# Patient Record
Sex: FEMALE | NOT HISPANIC OR LATINO | Employment: OTHER | URBAN - NONMETROPOLITAN AREA
[De-identification: names, ages, dates, MRNs, and addresses within clinical notes are randomized per-mention and may not be internally consistent; named-entity substitution may affect disease eponyms.]

---

## 2017-09-03 ENCOUNTER — APPOINTMENT (OUTPATIENT)
Dept: GENERAL RADIOLOGY | Facility: HOSPITAL | Age: 82
End: 2017-09-03

## 2017-09-03 ENCOUNTER — APPOINTMENT (OUTPATIENT)
Dept: CT IMAGING | Facility: HOSPITAL | Age: 82
End: 2017-09-03

## 2017-09-03 ENCOUNTER — HOSPITAL ENCOUNTER (EMERGENCY)
Facility: HOSPITAL | Age: 82
Discharge: HOME | End: 2017-09-03
Attending: EMERGENCY MEDICINE | Admitting: EMERGENCY MEDICINE

## 2017-09-03 VITALS
BODY MASS INDEX: 26.33 KG/M2 | HEART RATE: 92 BPM | HEIGHT: 65 IN | OXYGEN SATURATION: 99 % | DIASTOLIC BLOOD PRESSURE: 72 MMHG | RESPIRATION RATE: 18 BRPM | WEIGHT: 158 LBS | SYSTOLIC BLOOD PRESSURE: 140 MMHG | TEMPERATURE: 98.7 F

## 2017-09-03 DIAGNOSIS — S00.01XA SCALP ABRASION, INITIAL ENCOUNTER: Primary | ICD-10-CM

## 2017-09-03 DIAGNOSIS — N39.0 URINARY TRACT INFECTION WITHOUT HEMATURIA, SITE UNSPECIFIED: ICD-10-CM

## 2017-09-03 DIAGNOSIS — S00.03XA SCALP HEMATOMA, INITIAL ENCOUNTER: ICD-10-CM
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
## 2017-09-03 LAB
ALBUMIN SERPL-MCNC: 4.1 G/DL (ref 3.5–5)
ALBUMIN/GLOB SERPL: 1.2 G/DL (ref 1–2)
ALP SERPL-CCNC: 84 U/L (ref 38–126)
ALT SERPL W P-5'-P-CCNC: 28 U/L (ref 13–69)
ANION GAP SERPL CALCULATED.3IONS-SCNC: 16.5 MMOL/L
AST SERPL-CCNC: 23 U/L (ref 15–46)
BACTERIA UR QL AUTO: ABNORMAL /HPF
BASOPHILS # BLD AUTO: 0.04 10*3/MM3 (ref 0–0.2)
BASOPHILS NFR BLD AUTO: 0.5 % (ref 0–2.5)
BILIRUB SERPL-MCNC: 0.4 MG/DL (ref 0.2–1.3)
BILIRUB UR QL STRIP: NEGATIVE
BUN BLD-MCNC: 31 MG/DL (ref 7–20)
BUN/CREAT SERPL: 28.2 (ref 7.1–23.5)
CALCIUM SPEC-SCNC: 9.2 MG/DL (ref 8.4–10.2)
CHLORIDE SERPL-SCNC: 103 MMOL/L (ref 98–107)
CLARITY UR: ABNORMAL
CO2 SERPL-SCNC: 24 MMOL/L (ref 26–30)
COLOR UR: YELLOW
CREAT BLD-MCNC: 1.1 MG/DL (ref 0.6–1.3)
DEPRECATED RDW RBC AUTO: 49.8 FL (ref 37–54)
EOSINOPHIL # BLD AUTO: 0.25 10*3/MM3 (ref 0–0.7)
EOSINOPHIL NFR BLD AUTO: 3.2 % (ref 0–7)
ERYTHROCYTE [DISTWIDTH] IN BLOOD BY AUTOMATED COUNT: 14.7 % (ref 11.5–14.5)
GFR SERPL CREATININE-BSD FRML MDRD: 46 ML/MIN/1.73
GLOBULIN UR ELPH-MCNC: 3.4 GM/DL
GLUCOSE BLD-MCNC: 111 MG/DL (ref 74–98)
GLUCOSE UR STRIP-MCNC: NEGATIVE MG/DL
HCT VFR BLD AUTO: 35.8 % (ref 37–47)
HGB BLD-MCNC: 11.6 G/DL (ref 12–16)
HGB UR QL STRIP.AUTO: ABNORMAL
HYALINE CASTS UR QL AUTO: ABNORMAL /LPF
IMM GRANULOCYTES # BLD: 0.05 10*3/MM3 (ref 0–0.06)
IMM GRANULOCYTES NFR BLD: 0.6 % (ref 0–0.6)
KETONES UR QL STRIP: NEGATIVE
LEUKOCYTE ESTERASE UR QL STRIP.AUTO: ABNORMAL
LYMPHOCYTES # BLD AUTO: 1.78 10*3/MM3 (ref 0.6–3.4)
LYMPHOCYTES NFR BLD AUTO: 22.9 % (ref 10–50)
MCH RBC QN AUTO: 29.9 PG (ref 27–31)
MCHC RBC AUTO-ENTMCNC: 32.4 G/DL (ref 30–37)
MCV RBC AUTO: 92.3 FL (ref 81–99)
MONOCYTES # BLD AUTO: 0.73 10*3/MM3 (ref 0–0.9)
MONOCYTES NFR BLD AUTO: 9.4 % (ref 0–12)
NEUTROPHILS # BLD AUTO: 4.93 10*3/MM3 (ref 2–6.9)
NEUTROPHILS NFR BLD AUTO: 63.4 % (ref 37–80)
NITRITE UR QL STRIP: NEGATIVE
NRBC BLD MANUAL-RTO: 0.4 /100 WBC (ref 0–0)
PH UR STRIP.AUTO: 7 [PH] (ref 5–8)
PLATELET # BLD AUTO: 207 10*3/MM3 (ref 130–400)
PMV BLD AUTO: 9.5 FL (ref 6–12)
POTASSIUM BLD-SCNC: 4.5 MMOL/L (ref 3.5–5.1)
PROT SERPL-MCNC: 7.5 G/DL (ref 6.3–8.2)
PROT UR QL STRIP: NEGATIVE
RBC # BLD AUTO: 3.88 10*6/MM3 (ref 4.2–5.4)
RBC # UR: ABNORMAL /HPF
REF LAB TEST METHOD: ABNORMAL
SODIUM BLD-SCNC: 139 MMOL/L (ref 137–145)
SP GR UR STRIP: 1.01 (ref 1–1.03)
SQUAMOUS #/AREA URNS HPF: ABNORMAL /HPF
TROPONIN I SERPL-MCNC: <0.012 NG/ML (ref 0–0.03)
UROBILINOGEN UR QL STRIP: ABNORMAL
WBC NRBC COR # BLD: 7.78 10*3/MM3 (ref 4.8–10.8)
WBC UR QL AUTO: ABNORMAL /HPF

## 2017-09-03 PROCEDURE — 72125 CT NECK SPINE W/O DYE: CPT

## 2017-09-03 PROCEDURE — 73522 X-RAY EXAM HIPS BI 3-4 VIEWS: CPT

## 2017-09-03 PROCEDURE — 84484 ASSAY OF TROPONIN QUANT: CPT | Performed by: PHYSICIAN ASSISTANT

## 2017-09-03 PROCEDURE — 99284 EMERGENCY DEPT VISIT MOD MDM: CPT

## 2017-09-03 PROCEDURE — 87077 CULTURE AEROBIC IDENTIFY: CPT | Performed by: PHYSICIAN ASSISTANT

## 2017-09-03 PROCEDURE — 93005 ELECTROCARDIOGRAM TRACING: CPT | Performed by: PHYSICIAN ASSISTANT

## 2017-09-03 PROCEDURE — 87086 URINE CULTURE/COLONY COUNT: CPT | Performed by: PHYSICIAN ASSISTANT

## 2017-09-03 PROCEDURE — 71010 HC CHEST PA OR AP: CPT

## 2017-09-03 PROCEDURE — 80053 COMPREHEN METABOLIC PANEL: CPT | Performed by: PHYSICIAN ASSISTANT

## 2017-09-03 PROCEDURE — 87186 SC STD MICRODIL/AGAR DIL: CPT | Performed by: PHYSICIAN ASSISTANT

## 2017-09-03 PROCEDURE — 81001 URINALYSIS AUTO W/SCOPE: CPT | Performed by: PHYSICIAN ASSISTANT

## 2017-09-03 PROCEDURE — 85025 COMPLETE CBC W/AUTO DIFF WBC: CPT | Performed by: PHYSICIAN ASSISTANT

## 2017-09-03 PROCEDURE — 70450 CT HEAD/BRAIN W/O DYE: CPT

## 2017-09-03 RX ORDER — NITROFURANTOIN 25; 75 MG/1; MG/1
100 CAPSULE ORAL ONCE
Status: COMPLETED | OUTPATIENT
Start: 2017-09-03 | End: 2017-09-03

## 2017-09-03 RX ORDER — NITROFURANTOIN 25; 75 MG/1; MG/1
100 CAPSULE ORAL 2 TIMES DAILY
Qty: 14 CAPSULE | Refills: 0 | Status: SHIPPED | OUTPATIENT
Start: 2017-09-03 | End: 2017-12-02 | Stop reason: HOSPADM

## 2017-09-03 RX ADMIN — NITROFURANTOIN MONOHYDRATE/MACROCRYSTALLINE 100 MG: 25; 75 CAPSULE ORAL at 21:03

## 2017-09-03 NOTE — ED PROVIDER NOTES
Subjective   Patient is a 89 y.o. female presenting with fall.   History provided by:  Patient   used: No    Fall   Mechanism of injury: fall    Injury location:  Head/neck  Head/neck injury location:  Head  Arrived directly from scene: yes    Fall:     Fall occurred:  Walking    Impact surface:  [place]    Point of impact:  Head and neck    Entrapped after fall: no    Suspicion of alcohol use: no    Suspicion of drug use: no    Tetanus status:  Unknown  Prior to arrival data:     Bystander interventions:  Bystander C-spine precautions    Patient ambulatory at scene: no      Blood loss:  Minimal    Responsiveness at scene:  Alert    Orientation at scene:  Person, place and situation    Loss of consciousness: unknown       Amnesic to event: yes      Airway interventions:  None    Breathing interventions:  None  Current pain details:     Pain quality:  Aching, shooting and throbbing    Pain Severity:  Moderate  Associated symptoms: headaches and nausea    Associated symptoms: no abdominal pain, no back pain, no blindness and no neck pain    Risk factors: no AICD, no anticoagulation therapy, no asthma, no beta blocker therapy, no CHF, no COPD, no diabetes, no kidney disease, no pacemaker and no past MI        Review of Systems   Eyes: Negative for blindness.   Gastrointestinal: Positive for nausea. Negative for abdominal pain.   Musculoskeletal: Negative for back pain and neck pain.   Neurological: Positive for syncope and headaches.   All other systems reviewed and are negative.      Past Medical History:   Diagnosis Date   • Dementia        No Known Allergies    Past Surgical History:   Procedure Laterality Date   • HYSTERECTOMY         History reviewed. No pertinent family history.    Social History     Social History   • Marital status:      Spouse name: N/A   • Number of children: N/A   • Years of education: N/A     Social History Main Topics   • Smoking status: Never Smoker   •  Smokeless tobacco: None   • Alcohol use No   • Drug use: No   • Sexual activity: Not Asked     Other Topics Concern   • None     Social History Narrative   • None           Objective   Physical Exam   HENT:   Head: Normocephalic.       Right Ear: External ear normal.   Left Ear: External ear normal.   Nose: Nose normal.   Mouth/Throat: Oropharynx is clear and moist.   Large hematoma to right side of occipital lobe. Small abrasion present.    Eyes: Conjunctivae and EOM are normal. Pupils are equal, round, and reactive to light.   Neck: Normal range of motion. Neck supple. No JVD present. No tracheal deviation present. No thyromegaly present.   Cardiovascular: Normal rate and regular rhythm.    Murmur heard.   Systolic murmur is present with a grade of 4/6   Pulmonary/Chest: Effort normal. No stridor. No respiratory distress. She has no wheezes. She has no rales.   Abdominal: Bowel sounds are normal. She exhibits no distension. There is no tenderness.   Musculoskeletal: Normal range of motion. She exhibits tenderness. She exhibits no edema or deformity.        Right hip: She exhibits tenderness.        Left hip: She exhibits tenderness.   Neurological: She has normal reflexes. She is disoriented. She displays normal reflexes. No cranial nerve deficit or sensory deficit. Coordination normal.   Skin: Skin is warm. She is not diaphoretic.   Psychiatric: She has a normal mood and affect. Her behavior is normal. Judgment and thought content normal.   Nursing note and vitals reviewed.      Procedures         ED Course  ED Course   Comment By Time   Family is now at bedside. Patients grandson is at bedside and states he takes care of her. He states that patient walks with walker and slipped falling hitting her head against wall, she did not have LOC. She does have chronic dementia. Sonny Izquierdo PA-C 09/03 0867   89-year-old female came in after falling at home.  Patient did sustain a hematoma to the posterior aspect  of her occipital lobe.  Patient has a small abrasion.  There was nothing to suture or staple at this time. Patient will be treated for mild UTI. CT scan were negative for any acute findings. Sonny Izquierdo PA-C 09/03 2033                  MDM  Number of Diagnoses or Management Options  Fall, initial encounter: new and requires workup  Scalp abrasion, initial encounter: new and requires workup  Scalp hematoma, initial encounter: new and requires workup  Urinary tract infection without hematuria, site unspecified: new and requires workup     Amount and/or Complexity of Data Reviewed  Clinical lab tests: ordered and reviewed  Independent visualization of images, tracings, or specimens: yes    Risk of Complications, Morbidity, and/or Mortality  Presenting problems: moderate  Diagnostic procedures: moderate  Management options: moderate    Patient Progress  Patient progress: stable      Final diagnoses:   Scalp abrasion, initial encounter   Fall, initial encounter   Scalp hematoma, initial encounter   Urinary tract infection without hematuria, site unspecified            Sonny Izquierdo PA-C  09/03/17 2037

## 2017-09-06 LAB — BACTERIA SPEC AEROBE CULT: ABNORMAL

## 2017-09-13 ENCOUNTER — HOSPITAL ENCOUNTER (EMERGENCY)
Facility: HOSPITAL | Age: 82
Discharge: HOME | End: 2017-09-13
Attending: EMERGENCY MEDICINE | Admitting: EMERGENCY MEDICINE

## 2017-09-13 ENCOUNTER — APPOINTMENT (OUTPATIENT)
Dept: GENERAL RADIOLOGY | Facility: HOSPITAL | Age: 82
End: 2017-09-13
Attending: EMERGENCY MEDICINE

## 2017-09-13 VITALS
TEMPERATURE: 97.8 F | OXYGEN SATURATION: 96 % | SYSTOLIC BLOOD PRESSURE: 123 MMHG | DIASTOLIC BLOOD PRESSURE: 64 MMHG | HEIGHT: 63 IN | WEIGHT: 158 LBS | HEART RATE: 70 BPM | RESPIRATION RATE: 18 BRPM | BODY MASS INDEX: 28 KG/M2

## 2017-09-13 DIAGNOSIS — J18.9 PNEUMONIA OF LEFT LOWER LOBE DUE TO INFECTIOUS ORGANISM: Primary | ICD-10-CM

## 2017-09-13 LAB
ALBUMIN SERPL-MCNC: 4 G/DL (ref 3.5–5)
ALBUMIN/GLOB SERPL: 1.1 G/DL (ref 1–2)
ALP SERPL-CCNC: 85 U/L (ref 38–126)
ALT SERPL W P-5'-P-CCNC: 20 U/L (ref 13–69)
ANION GAP SERPL CALCULATED.3IONS-SCNC: 14.5 MMOL/L
AST SERPL-CCNC: 23 U/L (ref 15–46)
BACTERIA UR QL AUTO: ABNORMAL /HPF
BASOPHILS # BLD AUTO: 0.04 10*3/MM3 (ref 0–0.2)
BASOPHILS NFR BLD AUTO: 0.5 % (ref 0–2.5)
BILIRUB SERPL-MCNC: 0.5 MG/DL (ref 0.2–1.3)
BILIRUB UR QL STRIP: NEGATIVE
BUN BLD-MCNC: 29 MG/DL (ref 7–20)
BUN/CREAT SERPL: 24.2 (ref 7.1–23.5)
CALCIUM SPEC-SCNC: 9.3 MG/DL (ref 8.4–10.2)
CHLORIDE SERPL-SCNC: 102 MMOL/L (ref 98–107)
CLARITY UR: CLEAR
CO2 SERPL-SCNC: 25 MMOL/L (ref 26–30)
COLOR UR: YELLOW
CREAT BLD-MCNC: 1.2 MG/DL (ref 0.6–1.3)
D-LACTATE SERPL-SCNC: 1.2 MMOL/L (ref 0.5–2)
DEPRECATED RDW RBC AUTO: 47.3 FL (ref 37–54)
EOSINOPHIL # BLD AUTO: 0.3 10*3/MM3 (ref 0–0.7)
EOSINOPHIL NFR BLD AUTO: 3.5 % (ref 0–7)
ERYTHROCYTE [DISTWIDTH] IN BLOOD BY AUTOMATED COUNT: 14.2 % (ref 11.5–14.5)
GFR SERPL CREATININE-BSD FRML MDRD: 42 ML/MIN/1.73
GLOBULIN UR ELPH-MCNC: 3.5 GM/DL
GLUCOSE BLD-MCNC: 109 MG/DL (ref 74–98)
GLUCOSE UR STRIP-MCNC: NEGATIVE MG/DL
HCT VFR BLD AUTO: 34.2 % (ref 37–47)
HGB BLD-MCNC: 11.1 G/DL (ref 12–16)
HGB UR QL STRIP.AUTO: ABNORMAL
HOLD SPECIMEN: NORMAL
HOLD SPECIMEN: NORMAL
HYALINE CASTS UR QL AUTO: ABNORMAL /LPF
IMM GRANULOCYTES # BLD: 0.04 10*3/MM3 (ref 0–0.06)
IMM GRANULOCYTES NFR BLD: 0.5 % (ref 0–0.6)
KETONES UR QL STRIP: NEGATIVE
LEUKOCYTE ESTERASE UR QL STRIP.AUTO: NEGATIVE
LYMPHOCYTES # BLD AUTO: 1.53 10*3/MM3 (ref 0.6–3.4)
LYMPHOCYTES NFR BLD AUTO: 18 % (ref 10–50)
MCH RBC QN AUTO: 29.5 PG (ref 27–31)
MCHC RBC AUTO-ENTMCNC: 32.5 G/DL (ref 30–37)
MCV RBC AUTO: 91 FL (ref 81–99)
MONOCYTES # BLD AUTO: 0.84 10*3/MM3 (ref 0–0.9)
MONOCYTES NFR BLD AUTO: 9.9 % (ref 0–12)
MUCOUS THREADS URNS QL MICRO: ABNORMAL /HPF
NEUTROPHILS # BLD AUTO: 5.76 10*3/MM3 (ref 2–6.9)
NEUTROPHILS NFR BLD AUTO: 67.6 % (ref 37–80)
NITRITE UR QL STRIP: NEGATIVE
NRBC BLD MANUAL-RTO: 0 /100 WBC (ref 0–0)
PH UR STRIP.AUTO: 7 [PH] (ref 5–8)
PLATELET # BLD AUTO: 243 10*3/MM3 (ref 130–400)
PMV BLD AUTO: 9.8 FL (ref 6–12)
POTASSIUM BLD-SCNC: 4.5 MMOL/L (ref 3.5–5.1)
PROT SERPL-MCNC: 7.5 G/DL (ref 6.3–8.2)
PROT UR QL STRIP: NEGATIVE
RBC # BLD AUTO: 3.76 10*6/MM3 (ref 4.2–5.4)
RBC # UR: ABNORMAL /HPF
REF LAB TEST METHOD: ABNORMAL
SODIUM BLD-SCNC: 137 MMOL/L (ref 137–145)
SP GR UR STRIP: 1.01 (ref 1–1.03)
SQUAMOUS #/AREA URNS HPF: ABNORMAL /HPF
TROPONIN I SERPL-MCNC: 0.02 NG/ML (ref 0–0.03)
UROBILINOGEN UR QL STRIP: ABNORMAL
WBC NRBC COR # BLD: 8.51 10*3/MM3 (ref 4.8–10.8)
WBC UR QL AUTO: ABNORMAL /HPF
WHOLE BLOOD HOLD SPECIMEN: NORMAL
WHOLE BLOOD HOLD SPECIMEN: NORMAL

## 2017-09-13 PROCEDURE — 99284 EMERGENCY DEPT VISIT MOD MDM: CPT

## 2017-09-13 PROCEDURE — 80053 COMPREHEN METABOLIC PANEL: CPT | Performed by: EMERGENCY MEDICINE

## 2017-09-13 PROCEDURE — 85025 COMPLETE CBC W/AUTO DIFF WBC: CPT | Performed by: EMERGENCY MEDICINE

## 2017-09-13 PROCEDURE — 84484 ASSAY OF TROPONIN QUANT: CPT | Performed by: EMERGENCY MEDICINE

## 2017-09-13 PROCEDURE — 96360 HYDRATION IV INFUSION INIT: CPT

## 2017-09-13 PROCEDURE — 81001 URINALYSIS AUTO W/SCOPE: CPT | Performed by: EMERGENCY MEDICINE

## 2017-09-13 PROCEDURE — 93005 ELECTROCARDIOGRAM TRACING: CPT | Performed by: EMERGENCY MEDICINE

## 2017-09-13 PROCEDURE — 71020 HC CHEST PA AND LATERAL: CPT

## 2017-09-13 PROCEDURE — 83605 ASSAY OF LACTIC ACID: CPT | Performed by: EMERGENCY MEDICINE

## 2017-09-13 PROCEDURE — 96361 HYDRATE IV INFUSION ADD-ON: CPT

## 2017-09-13 RX ORDER — LEVOFLOXACIN 750 MG/1
375 TABLET ORAL DAILY
Qty: 4 TABLET | Refills: 0 | Status: SHIPPED | OUTPATIENT
Start: 2017-09-13 | End: 2017-09-20

## 2017-09-13 RX ORDER — SODIUM CHLORIDE 0.9 % (FLUSH) 0.9 %
10 SYRINGE (ML) INJECTION AS NEEDED
Status: DISCONTINUED | OUTPATIENT
Start: 2017-09-13 | End: 2017-09-13 | Stop reason: HOSPADM

## 2017-09-13 RX ADMIN — SODIUM CHLORIDE 1000 ML: 9 INJECTION, SOLUTION INTRAVENOUS at 15:00

## 2017-09-13 NOTE — ED PROVIDER NOTES
"Subjective   HPI Comments: 89-year-old female presenting with general malaise.  She is brought in by her daughter-in-law who provides all the history as the patient has a history of dementia.  Apparently 2 weeks ago patient was seen here in this ED for a fall, she was diagnosed with a urinary tract infection and placed on Macrobid.  She was told by her primary doctor, LEENA, this was not working so she was switched to Cipro.  Apparently they called back again and said this was not working and wrote for Cefdinir.  He had 2 doses of this.  Her daughter-in-law says that she is become \"lethargic\" and she is afraid she is becoming \"septic\".  She states that she looked flushed and has had some low blood pressures.  There's been no report of cough, vomiting, diarrhea.      Review of Systems   Unable to perform ROS: Dementia       Past Medical History:   Diagnosis Date   • Dementia        No Known Allergies    Past Surgical History:   Procedure Laterality Date   • HYSTERECTOMY         History reviewed. No pertinent family history.    Social History     Social History   • Marital status:      Spouse name: N/A   • Number of children: N/A   • Years of education: N/A     Social History Main Topics   • Smoking status: Never Smoker   • Smokeless tobacco: None   • Alcohol use No   • Drug use: No   • Sexual activity: Not Asked     Other Topics Concern   • None     Social History Narrative           Objective   Physical Exam   Constitutional: She appears well-developed and well-nourished. No distress.   HENT:   Head: Normocephalic and atraumatic.   Right Ear: External ear normal.   Left Ear: External ear normal.   Nose: Nose normal.   Mouth/Throat: Oropharynx is clear and moist.   Eyes: Conjunctivae and EOM are normal. Pupils are equal, round, and reactive to light.   Neck: Normal range of motion. Neck supple.   Cardiovascular: Normal rate, regular rhythm, normal heart sounds and intact distal pulses.    Pulmonary/Chest: " "Effort normal and breath sounds normal. No respiratory distress.   Abdominal: Soft. Bowel sounds are normal. She exhibits no distension. There is no tenderness. There is no rebound and no guarding.   Musculoskeletal: Normal range of motion. She exhibits no edema, tenderness or deformity.   Neurological: She is alert.   Skin: Skin is warm and dry. No rash noted.   Psychiatric: She has a normal mood and affect. Her behavior is normal.   Nursing note and vitals reviewed.      Procedures         ED Course  ED Course                  MDM  Number of Diagnoses or Management Options  Pneumonia of left lower lobe due to infectious organism:   Diagnosis management comments: 89 year old female with \"lethargy\" and \"concern about sepsis\". Well developed, well nourished elderly demented female in no distress with normal vital signs and an otherwise non focal exam. Will check ekg, cxr, ua and labs. Will hydrate. Disposition pending work up.    Ddx: uti, pna, dehydration, tony, lyte abnormality    Ekg: sinus rhythm, PVC, no acute ST changes    Labs are essentially unremarkable. UA is actually clear. CXR notable for LLL atelectasis v infiltrate. Long discussion with family regarding plan of care. Offered admission for IV antibiotics which would be reasonable given she was on antibiotics. They prefer to go home and treat with PO antibiotics. Her daughter in law is a nurse and is comfortable caring for her. I think this is a reasonable plan as well. Return precautions discussed. They are comfortable with and understanding of the plan.       Amount and/or Complexity of Data Reviewed  Decide to obtain previous medical records or to obtain history from someone other than the patient: yes        Final diagnoses:   Pneumonia of left lower lobe due to infectious organism            Sandro Garcia MD  09/14/17 0708    "

## 2017-11-30 ENCOUNTER — APPOINTMENT (OUTPATIENT)
Dept: CT IMAGING | Facility: HOSPITAL | Age: 82
End: 2017-11-30

## 2017-11-30 ENCOUNTER — APPOINTMENT (OUTPATIENT)
Dept: GENERAL RADIOLOGY | Facility: HOSPITAL | Age: 82
End: 2017-11-30

## 2017-11-30 ENCOUNTER — HOSPITAL ENCOUNTER (OUTPATIENT)
Facility: HOSPITAL | Age: 82
Setting detail: OBSERVATION
Discharge: HOME | End: 2017-12-02
Attending: EMERGENCY MEDICINE | Admitting: INTERNAL MEDICINE

## 2017-11-30 DIAGNOSIS — R11.10 VOMITING, INTRACTABILITY OF VOMITING NOT SPECIFIED, PRESENCE OF NAUSEA NOT SPECIFIED, UNSPECIFIED VOMITING TYPE: ICD-10-CM

## 2017-11-30 DIAGNOSIS — R55 SYNCOPE, UNSPECIFIED SYNCOPE TYPE: Primary | ICD-10-CM

## 2017-11-30 DIAGNOSIS — Z74.09 IMPAIRED FUNCTIONAL MOBILITY, BALANCE, GAIT, AND ENDURANCE: ICD-10-CM

## 2017-11-30 LAB
ALBUMIN SERPL-MCNC: 3.8 G/DL (ref 3.5–5)
ALBUMIN/GLOB SERPL: 1.2 G/DL (ref 1–2)
ALP SERPL-CCNC: 89 U/L (ref 38–126)
ALT SERPL W P-5'-P-CCNC: 22 U/L (ref 13–69)
ANION GAP SERPL CALCULATED.3IONS-SCNC: 15.5 MMOL/L
AST SERPL-CCNC: 25 U/L (ref 15–46)
BASOPHILS # BLD AUTO: 0.03 10*3/MM3 (ref 0–0.2)
BASOPHILS NFR BLD AUTO: 0.4 % (ref 0–2.5)
BILIRUB SERPL-MCNC: 0.4 MG/DL (ref 0.2–1.3)
BUN BLD-MCNC: 32 MG/DL (ref 7–20)
BUN/CREAT SERPL: 32 (ref 7.1–23.5)
CALCIUM SPEC-SCNC: 9 MG/DL (ref 8.4–10.2)
CHLORIDE SERPL-SCNC: 103 MMOL/L (ref 98–107)
CO2 SERPL-SCNC: 22 MMOL/L (ref 26–30)
CREAT BLD-MCNC: 1 MG/DL (ref 0.6–1.3)
DEPRECATED RDW RBC AUTO: 46.9 FL (ref 37–54)
EOSINOPHIL # BLD AUTO: 0.25 10*3/MM3 (ref 0–0.7)
EOSINOPHIL NFR BLD AUTO: 3.5 % (ref 0–7)
ERYTHROCYTE [DISTWIDTH] IN BLOOD BY AUTOMATED COUNT: 13.8 % (ref 11.5–14.5)
GFR SERPL CREATININE-BSD FRML MDRD: 52 ML/MIN/1.73
GLOBULIN UR ELPH-MCNC: 3.2 GM/DL
GLUCOSE BLD-MCNC: 154 MG/DL (ref 74–98)
HCT VFR BLD AUTO: 33.2 % (ref 37–47)
HGB BLD-MCNC: 10.8 G/DL (ref 12–16)
IMM GRANULOCYTES # BLD: 0.02 10*3/MM3 (ref 0–0.06)
IMM GRANULOCYTES NFR BLD: 0.3 % (ref 0–0.6)
LIPASE SERPL-CCNC: 102 U/L (ref 23–300)
LYMPHOCYTES # BLD AUTO: 1.35 10*3/MM3 (ref 0.6–3.4)
LYMPHOCYTES NFR BLD AUTO: 19.1 % (ref 10–50)
MCH RBC QN AUTO: 30 PG (ref 27–31)
MCHC RBC AUTO-ENTMCNC: 32.5 G/DL (ref 30–37)
MCV RBC AUTO: 92.2 FL (ref 81–99)
MONOCYTES # BLD AUTO: 0.74 10*3/MM3 (ref 0–0.9)
MONOCYTES NFR BLD AUTO: 10.5 % (ref 0–12)
NEUTROPHILS # BLD AUTO: 4.66 10*3/MM3 (ref 2–6.9)
NEUTROPHILS NFR BLD AUTO: 66.2 % (ref 37–80)
NRBC BLD MANUAL-RTO: 0 /100 WBC (ref 0–0)
NT-PROBNP SERPL-MCNC: 7820 PG/ML (ref 0–450)
PLATELET # BLD AUTO: 214 10*3/MM3 (ref 130–400)
PMV BLD AUTO: 9.6 FL (ref 6–12)
POTASSIUM BLD-SCNC: 3.5 MMOL/L (ref 3.5–5.1)
PROT SERPL-MCNC: 7 G/DL (ref 6.3–8.2)
RBC # BLD AUTO: 3.6 10*6/MM3 (ref 4.2–5.4)
SODIUM BLD-SCNC: 137 MMOL/L (ref 137–145)
TROPONIN I SERPL-MCNC: <0.012 NG/ML (ref 0–0.03)
WBC NRBC COR # BLD: 7.05 10*3/MM3 (ref 4.8–10.8)

## 2017-11-30 PROCEDURE — 93005 ELECTROCARDIOGRAM TRACING: CPT | Performed by: EMERGENCY MEDICINE

## 2017-11-30 PROCEDURE — 84484 ASSAY OF TROPONIN QUANT: CPT | Performed by: EMERGENCY MEDICINE

## 2017-11-30 PROCEDURE — 70450 CT HEAD/BRAIN W/O DYE: CPT

## 2017-11-30 PROCEDURE — 71020 HC CHEST PA AND LATERAL: CPT

## 2017-11-30 PROCEDURE — 85025 COMPLETE CBC W/AUTO DIFF WBC: CPT | Performed by: EMERGENCY MEDICINE

## 2017-11-30 PROCEDURE — 83880 ASSAY OF NATRIURETIC PEPTIDE: CPT | Performed by: EMERGENCY MEDICINE

## 2017-11-30 PROCEDURE — 99284 EMERGENCY DEPT VISIT MOD MDM: CPT

## 2017-11-30 PROCEDURE — 80053 COMPREHEN METABOLIC PANEL: CPT | Performed by: EMERGENCY MEDICINE

## 2017-11-30 PROCEDURE — 83690 ASSAY OF LIPASE: CPT | Performed by: EMERGENCY MEDICINE

## 2017-12-01 ENCOUNTER — APPOINTMENT (OUTPATIENT)
Dept: CARDIOLOGY | Facility: HOSPITAL | Age: 82
End: 2017-12-01
Attending: INTERNAL MEDICINE

## 2017-12-01 PROBLEM — G30.1 ALZHEIMER'S DEMENTIA, LATE ONSET (HCC): Chronic | Status: ACTIVE | Noted: 2017-12-01

## 2017-12-01 PROBLEM — F03.90 DEMENTIA (HCC): Status: ACTIVE | Noted: 2017-12-01

## 2017-12-01 PROBLEM — F01.50 MULTI-INFARCT DEMENTIA (HCC): Chronic | Status: ACTIVE | Noted: 2017-12-01

## 2017-12-01 PROBLEM — I63.9 STROKE (HCC): Status: ACTIVE | Noted: 2017-12-01

## 2017-12-01 PROBLEM — R55 SYNCOPE: Status: ACTIVE | Noted: 2017-12-01

## 2017-12-01 PROBLEM — D64.9 MILD ANEMIA: Chronic | Status: ACTIVE | Noted: 2017-12-01

## 2017-12-01 PROBLEM — I49.8 OTHER SPECIFIED CARDIAC ARRHYTHMIAS (CODE): Status: ACTIVE | Noted: 2017-12-01

## 2017-12-01 PROBLEM — F02.80 ALZHEIMER'S DEMENTIA, LATE ONSET (HCC): Chronic | Status: ACTIVE | Noted: 2017-12-01

## 2017-12-01 LAB
AMORPH URATE CRY URNS QL MICRO: ABNORMAL /HPF
BACTERIA UR QL AUTO: ABNORMAL /HPF
BH CV ECHO MEAS - AO MAX PG: 74 MMHG
BH CV ECHO MEAS - AO MEAN PG: 46 MMHG
BH CV ECHO MEAS - EF(MOD-SP4): 70 %
BH CV ECHO MEAS - RVSP: 48 MMHG
BILIRUB UR QL STRIP: NEGATIVE
CLARITY UR: CLEAR
COLOR UR: YELLOW
ERYTHROCYTE [SEDIMENTATION RATE] IN BLOOD: 40 MM/HR (ref 0–20)
GLUCOSE UR STRIP-MCNC: NEGATIVE MG/DL
HGB UR QL STRIP.AUTO: ABNORMAL
HYALINE CASTS UR QL AUTO: ABNORMAL /LPF
IRON 24H UR-MRATE: 72 MCG/DL (ref 37–181)
IRON SATN MFR SERPL: 31 % (ref 11–46)
KETONES UR QL STRIP: NEGATIVE
LEUKOCYTE ESTERASE UR QL STRIP.AUTO: NEGATIVE
MAXIMAL PREDICTED HEART RATE: 126 BPM
NITRITE UR QL STRIP: NEGATIVE
PH UR STRIP.AUTO: 6.5 [PH] (ref 5–8)
PROT UR QL STRIP: NEGATIVE
RBC # UR: ABNORMAL /HPF
REF LAB TEST METHOD: ABNORMAL
SP GR UR STRIP: 1.01 (ref 1–1.03)
SQUAMOUS #/AREA URNS HPF: ABNORMAL /HPF
STRESS TARGET HR: 107 BPM
TIBC SERPL-MCNC: 232 MCG/DL (ref 261–497)
TSH SERPL DL<=0.05 MIU/L-ACNC: 6.44 MIU/ML (ref 0.47–4.68)
UROBILINOGEN UR QL STRIP: ABNORMAL
VIT B12 BLD-MCNC: 385 PG/ML (ref 239–931)
WBC UR QL AUTO: ABNORMAL /HPF

## 2017-12-01 PROCEDURE — 25010000002 ENOXAPARIN PER 10 MG: Performed by: INTERNAL MEDICINE

## 2017-12-01 PROCEDURE — 83921 ORGANIC ACID SINGLE QUANT: CPT | Performed by: INTERNAL MEDICINE

## 2017-12-01 PROCEDURE — 96374 THER/PROPH/DIAG INJ IV PUSH: CPT

## 2017-12-01 PROCEDURE — G8978 MOBILITY CURRENT STATUS: HCPCS

## 2017-12-01 PROCEDURE — 93005 ELECTROCARDIOGRAM TRACING: CPT | Performed by: INTERNAL MEDICINE

## 2017-12-01 PROCEDURE — 81001 URINALYSIS AUTO W/SCOPE: CPT | Performed by: EMERGENCY MEDICINE

## 2017-12-01 PROCEDURE — 93306 TTE W/DOPPLER COMPLETE: CPT

## 2017-12-01 PROCEDURE — 83540 ASSAY OF IRON: CPT | Performed by: INTERNAL MEDICINE

## 2017-12-01 PROCEDURE — G8980 MOBILITY D/C STATUS: HCPCS

## 2017-12-01 PROCEDURE — G0378 HOSPITAL OBSERVATION PER HR: HCPCS

## 2017-12-01 PROCEDURE — 85651 RBC SED RATE NONAUTOMATED: CPT | Performed by: INTERNAL MEDICINE

## 2017-12-01 PROCEDURE — 83550 IRON BINDING TEST: CPT | Performed by: INTERNAL MEDICINE

## 2017-12-01 PROCEDURE — 87086 URINE CULTURE/COLONY COUNT: CPT | Performed by: EMERGENCY MEDICINE

## 2017-12-01 PROCEDURE — 25010000002 CEFTRIAXONE IN SWFI 1 GRAM/10ML IV PUSH SYRINGE (SIMPLE): Performed by: INTERNAL MEDICINE

## 2017-12-01 PROCEDURE — 96372 THER/PROPH/DIAG INJ SC/IM: CPT

## 2017-12-01 PROCEDURE — 82607 VITAMIN B-12: CPT | Performed by: INTERNAL MEDICINE

## 2017-12-01 PROCEDURE — 84443 ASSAY THYROID STIM HORMONE: CPT | Performed by: INTERNAL MEDICINE

## 2017-12-01 PROCEDURE — 97162 PT EVAL MOD COMPLEX 30 MIN: CPT

## 2017-12-01 PROCEDURE — G8979 MOBILITY GOAL STATUS: HCPCS

## 2017-12-01 RX ORDER — FLUOXETINE HYDROCHLORIDE 20 MG/1
20 CAPSULE ORAL DAILY
COMMUNITY

## 2017-12-01 RX ORDER — LORATADINE 10 MG/1
10 TABLET ORAL DAILY
COMMUNITY

## 2017-12-01 RX ORDER — OMEPRAZOLE 40 MG/1
40 CAPSULE, DELAYED RELEASE ORAL DAILY
COMMUNITY

## 2017-12-01 RX ORDER — ERGOCALCIFEROL 1.25 MG/1
50000 CAPSULE ORAL WEEKLY
COMMUNITY

## 2017-12-01 RX ORDER — FLUOXETINE HYDROCHLORIDE 20 MG/1
20 CAPSULE ORAL DAILY
Status: DISCONTINUED | OUTPATIENT
Start: 2017-12-02 | End: 2017-12-02 | Stop reason: HOSPADM

## 2017-12-01 RX ORDER — QUETIAPINE FUMARATE 25 MG/1
12.5 TABLET, FILM COATED ORAL NIGHTLY
Status: DISCONTINUED | OUTPATIENT
Start: 2017-12-01 | End: 2017-12-02 | Stop reason: HOSPADM

## 2017-12-01 RX ORDER — LEVOTHYROXINE SODIUM 0.03 MG/1
25 TABLET ORAL DAILY
COMMUNITY

## 2017-12-01 RX ORDER — SODIUM CHLORIDE 0.9 % (FLUSH) 0.9 %
1-10 SYRINGE (ML) INJECTION AS NEEDED
Status: DISCONTINUED | OUTPATIENT
Start: 2017-12-01 | End: 2017-12-02 | Stop reason: HOSPADM

## 2017-12-01 RX ORDER — LEVOTHYROXINE SODIUM 0.03 MG/1
25 TABLET ORAL DAILY
Status: DISCONTINUED | OUTPATIENT
Start: 2017-12-02 | End: 2017-12-02 | Stop reason: HOSPADM

## 2017-12-01 RX ORDER — ASPIRIN 325 MG
162 TABLET ORAL DAILY
Status: DISCONTINUED | OUTPATIENT
Start: 2017-12-01 | End: 2017-12-02 | Stop reason: HOSPADM

## 2017-12-01 RX ORDER — QUETIAPINE FUMARATE 25 MG/1
12.5 TABLET, FILM COATED ORAL NIGHTLY
COMMUNITY

## 2017-12-01 RX ADMIN — ENOXAPARIN SODIUM 30 MG: 30 INJECTION SUBCUTANEOUS at 07:02

## 2017-12-01 RX ADMIN — QUETIAPINE FUMARATE 12.5 MG: 25 TABLET, FILM COATED ORAL at 22:26

## 2017-12-01 RX ADMIN — CEFTRIAXONE SODIUM 1000 MG: 1 INJECTION, POWDER, FOR SOLUTION INTRAMUSCULAR; INTRAVENOUS at 07:02

## 2017-12-01 RX ADMIN — ASPIRIN 325 MG ORAL TABLET 162 MG: 325 PILL ORAL at 10:13

## 2017-12-01 NOTE — CONSULTS
Casey Mcfarland MD  CARDIOLOGY CONSULT    PATIENT: Lotus Caruso                                                   DATE: 17   313/1  : 1923     PRIMARY PHYSICIAN: ALEJANDRO    CHIEF COMPLAINT: Possible dysrhythmias    HISTORY OF PRESENT ILLNESS:  Patient is 89 y.o. old  female with risk profile for atherosclerotic cardiovascular disease, who reportedly had prior history of acute coronary artery syndrome and possible multiple TIA/stroke with history of possible multi-infarct dementia, was hospitalized on account of apparent altered mental status versus seizure disorder and on subsequent telemetry, she was found to have both atrial and ventricular ectopy though she has not had any sustained dysrhythmias so for.    Patient, who has history of dementia as outlined earlier, has not been able to furnish all historic data and most of information was obtained in discussion with nursing staff and on perusal of her chart.    It appears that the patient has had gradual functional decline over the years and apparently has sustained multiple falls though without definitive known prior history of loss of consciousness.  Patient does not believe that she has had any persistent palpitation in the past and is not known to have any sustained dysrhythmias.  She apparently has history of CVA disorder and was noticed to be staring into the space and was poorly communicative and possibility of seizure disorder was entertained.  Patient apparently did not lose consciousness though additional historic data is not available.  She has otherwise stayed hemodynamically stable so for while on telemetry and had ectopy but without definitive known conduction abnormalities the possibility of junctional rhythm was entertained based on telemetry which did demonstrate significant underlying artifact.  However there is no definitive evidence of high-grade bradyarrhythmias.    Patient, who reportedly had history of acute coronary artery  "syndrome, denies any chest pain though as noted earlier patient is functionally very limited.    Patient, who appears to have valvular disease including potential significant aortic stenosis, has not noticed significant shortness of hair with no recent history of orthopnea, PND's.    Review of system: No meaningful review of symptoms as possible.     PAST MEDICAL HISTORY:   1. Atherosclerotic Cardiovascular Disease:   • History of possible prior acute coronary artery syndrome which was managed conservatively.  However historic data is not available.  Apparently there is no prior history of scintigraphic or angiographic studies.  • History of possible recurrent TIA/stroke with no definitive known carotid disease.  2. History of valvular disease likely aortic stenosis with or without associated mitral and tricuspid insufficiency.  3. History of dementia conceivably multi-infarct dementia versus Alzheimer's disease.  Patient has had gradual memory impairment and she has her grandson as power of .  4. History of degenerative joint disease.    PAST SURGICAL HISTORY:    1. History of hysterectomy    SOCIAL HISTORY: Patient is a .  She believes she never smoked.  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    FAMILY HISTORY: Not contributory    PHYSICAL EXAMINATION:  GENERAL: Very pleasant elderly female  /77 (BP Location: Right arm, Patient Position: Lying)  Pulse 75  Temp 97.9 °F (36.6 °C) (Oral)   Resp 18  Ht 61.5\" (156.2 cm)  Wt 143 lb 12.8 oz (65.2 kg)  SpO2 95%  BMI 26.73 kg/m2 Body mass index is 26.73 kg/(m^2). HEENT:  Head: Atraumatic, normocephalic, No tenderness over paranasal sinuses, external nares normal. No oral or nasal mucosal lesion. Sclera non-icteric ocular movements are normal with pupils reacting both to light and accommodations. Ocular fundi not seen. NECK: Carotid upstroke is normal, there are no carotid bruits, no JVD, no thyromegaly, no cervical or axillary " lymphadenopathy. HEART: Jackson beat is not displaced, no thrill is appreciated and both heart sounds are normal.  Patient has pansystolic murmur at left sternal area and ejection murmur at aortic area. BRONCHOPULMONARY: Patient has good air entry bilaterally with bronchovesicular sounds. No adventious sounds audible. GI & : Soft, no tenderness elicited and no viscera are palpable. Bowel sounds are positive and there is no renal bruits audible. EXTREMITIES:  There is no radio-femoral delay . All vessels are normally palpable and there are no femoral bruits audible. Patient does not have dependent edema. DERM: No skin rash. CNS: No gross motor neurological deficit. MUSCULOSKELETAL: No joint swelling notice and patient has normal range of motion in lower extremity.     No intake or output data in the 24 hours ending 12/01/17 0819  Wt Readings from Last 7 Encounters:   12/01/17 143 lb 12.8 oz (65.2 kg)   09/13/17 158 lb (71.7 kg)   09/03/17 158 lb (71.7 kg)     LABS:     Results from last 7 days  Lab Units 11/30/17  2320   WBC 10*3/mm3 7.05   HEMOGLOBIN g/dL 10.8*   HEMATOCRIT % 33.2*   PLATELETS 10*3/mm3 214       Results from last 7 days  Lab Units 11/30/17  2320   SODIUM mmol/L 137   POTASSIUM mmol/L 3.5   CHLORIDE mmol/L 103   CO2 mmol/L 22.0*   BUN mg/dL 32*   CREATININE mg/dL 1.00   GLUCOSE mg/dL 154*   CALCIUM mg/dL 9.0       Results from last 7 days  Lab Units 11/30/17  2320   TROPONIN I ng/mL <0.012              No results found for: HGBA1C    Results from last 7 days  Lab Units 12/01/17  0605   TSH mIU/mL 6.440*       RADIOLOGY: Imaging Results (last 24 hours)     Procedure Component Value Units Date/Time    XR Chest 2 View [[id number]] Collected:  12/01/17 0724     Updated:  12/01/17 0733    Narrative:       PROCEDURE: XR CHEST 2 VW-     HISTORY: ?unresponsive episode, vomiting     COMPARISON: September 13, 2017.     FINDINGS: Electrodes overlie the chest. Atherosclerosis is noted. The  heart is normal in  "size. The mediastinum is unremarkable. Similar  appearance is seen to the left lower lobe which may represent  atelectasis versus pneumonia. There is no pneumothorax.  There are no  acute osseous abnormalities.           Impression:       Similar appearance is seen to the left lower lobe which may  represent atelectasis versus pneumonia.     Continued followup is recommended.     This report was finalized on 12/1/2017 7:29 AM by Sang Harris DO.    CT Head Without Contrast [[id number]] Collected:  12/01/17 0730     Updated:  12/01/17 0735    Narrative:       PROCEDURE: CT HEAD WO CONTRAST-     HISTORY: unresponsive episode, vomiting, \"weakness\"     COMPARISON: September 3, 2017.     TECHNIQUE: Multiple axial CT images were performed from the foramen  magnum to the vertex without enhancement.      FINDINGS: There is no CT evidence of hemorrhage. There is no mass, mass  effect or midline shift.  Cerebral atrophy is noted, similar to prior.  There are nonspecific periventricular white matter changes. Mild mucosal  thickening in the paranasal sinuses. Bone windows reveal no acute  osseous abnormalities.       Impression:       No acute intracranial process.             775.26 mGy.cm          This study was performed with techniques to keep radiation doses as low  as reasonably achievable (ALARA). Individualized dose reduction  techniques using automated exposure control or adjustment of mA and/or  kV according to the patient size were employed.      This report was finalized on 12/1/2017 7:32 AM by Sang Harris DO.          No Known Allergies    aspirin 162 mg Oral Daily   enoxaparin 30 mg Subcutaneous Q24H        ASSESSMENT /PLAN:    1. Patient is 89 y.o. with risk profile for atherosclerotic cardiovascular disease as outlined previously, who was hospitalized on account of apparent altered mental status and a subsequent telemetry possibility of conduction abnormalities including possible junctional rhythm was " entertained though review of telemetry strips, which demonstrated significant baseline artifact, does not appear to reflect that she had definitive bradyarrhythmias although she does have rather frequent APCs and to some degree PVCs and considerably has some nonconducted premature beats of no hemodynamic consequence.  It was felt that, however, she has substantial risk for atrial fibrillation and to some degree even high-grade dysrhythmias.  At this stage she would not necessarily merit aggressive beta blocker therapy.  She would understandably merit assessment of LV and RV function.  2. Patient does appear to have findings suggestive of aortic stenosis which conceivably may be hemodynamically significant.  In addition there is suggestion of possible associated mitral and tricuspid insufficiency as well.  Depending on severity of valvular disease, there is certainly future potential for morbidity and mortality.  3. Patient, who reportedly had history of acute coronary syndrome, has been remarkably free of chest pain during current hospitalization though recently she does appear to have underlying dementia.  Her serum troponin was normal.  If she does have definitive wall motion abnormalities and she would merit introduction of beta blocker therapy.  4. Risk reduction.  Patient, who never smoked, would be considered candidate for low-dose empiric statin.  5. Other issues noted include history of significant cerebral atrophy though without definitive evidence of multiple prior strokes.  She conceivably has only Alzheimer's disease.  She apparently has sustained multiple falls and has had strokes and possibly a seizure disorder.  Patient does not use any supportive devices.                     In closing, I sincerely appreciate opportunity to participate in care of this patient. If I can be of any further assistance with the management of this patient, please don’t hesitate to contact me.    ANITRA TIDWELL M.D.  FACC

## 2017-12-01 NOTE — ED PROVIDER NOTES
"Subjective   HPI Comments: 89-year-old female brought in by family with multiple concerns.  Patient has a history of dementia, she has no complaints and cannot provide any history.  Per family earlier today she \"did not feel good\" and had several episodes of vomiting.  Tonight she had an episode that lasted about 3 minutes where she stared off into space, at that time she \"shit herself, which is weird\".  There is no seizure-like activity, she was apparently awake during the episode, she did not fall.  There've been no fevers, complaints of chest pain or shortness of breath.  Again no patient has dementia and cannot provide any history.       Review of Systems   Unable to perform ROS: Dementia       Past Medical History:   Diagnosis Date   • Dementia        No Known Allergies    Past Surgical History:   Procedure Laterality Date   • HYSTERECTOMY         History reviewed. No pertinent family history.    Social History     Social History   • Marital status:      Spouse name: N/A   • Number of children: N/A   • Years of education: N/A     Social History Main Topics   • Smoking status: Never Smoker   • Smokeless tobacco: Never Used   • Alcohol use No   • Drug use: No   • Sexual activity: Not Asked     Other Topics Concern   • None     Social History Narrative   • None           Objective   Physical Exam   Constitutional: No distress.   Elderly, frail   HENT:   Head: Normocephalic and atraumatic.   Right Ear: External ear normal.   Left Ear: External ear normal.   Nose: Nose normal.   Mouth/Throat: Oropharynx is clear and moist.   Eyes: Conjunctivae and EOM are normal. Pupils are equal, round, and reactive to light.   Neck: Normal range of motion. Neck supple.   Cardiovascular: Normal rate, regular rhythm, normal heart sounds and intact distal pulses.    Systolic ejection murmur that radiates to the carotids   Pulmonary/Chest: Effort normal and breath sounds normal. No respiratory distress.   Abdominal: Soft. Bowel " sounds are normal. She exhibits no distension. There is no tenderness. There is no rebound and no guarding.   Musculoskeletal: Normal range of motion. She exhibits no edema, tenderness or deformity.   Neurological: She is alert.   Moves all extremities with normal strength   Skin: Skin is warm and dry. No rash noted.   Psychiatric:   Cannot assess   Nursing note and vitals reviewed.      Procedures         ED Course  ED Course                  MDM  Number of Diagnoses or Management Options  Syncope, unspecified syncope type:   Vomiting, intractability of vomiting not specified, presence of nausea not specified, unspecified vomiting type:   Diagnosis management comments: 89-year-old female with an unresponsive episode and nausea/vomiting.  Elderly frail female in no distress with normal vital signs and an otherwise nonfocal exam.  We'll check labs, EKG, chest x-ray and head CT.  Disposition pending workup.    DDX: ACS, TIA, CVA, ICH, seizure, electrolyte abnormality, pneumonia, UTI    EKG: Sinus rhythm, normal rate, left axis, inferior and lateral T-wave changes, mild ST depression laterally, morphology overall appears similar to previous though the ST depressions are slightly worse    Chest x-ray and head CT are both without acute findings.  Labwork notable for elevated BNP.  UA does not appear to be infected.  Patient has remained stable.  Given her age and risk factors recommended admission, family is happy and agreeable with this plan.  Discussed with Dr. Bear for admission.       Amount and/or Complexity of Data Reviewed  Decide to obtain previous medical records or to obtain history from someone other than the patient: yes        Final diagnoses:   Syncope, unspecified syncope type   Vomiting, intractability of vomiting not specified, presence of nausea not specified, unspecified vomiting type            Sandro Garcia MD  12/01/17 0103

## 2017-12-01 NOTE — PROGRESS NOTES
"Discharge Planning Assessment  [facility]     Patient Name: Lotus Caruso  MRN: [id number]  Today's Date: 12/1/2017    Admit Date: 11/30/2017          Discharge Needs Assessment       12/01/17 1240    Living Environment    Lives With child(zaina), adult;other (see comments)   Adult grandson    Living Arrangements house    Home Accessibility bed and bath on same level;grab bars present (bathtub);grab bars present (toilet);ramps present at home    Type of Financial/Environmental Concern none    Transportation Available family or friend will provide    Living Environment    Provides Primary Care For no one, unable/limited ability to care for self    Primary Care Provided By other (see comments)   Grandson and his wife    Quality Of Family Relationships supportive    Able to Return to Prior Living Arrangements yes    Discharge Needs Assessment    Concerns To Be Addressed no discharge needs identified    Readmission Within The Last 30 Days no previous admission in last 30 days    Anticipated Changes Related to Illness none    Equipment Currently Used at Home rollator;shower chair;raised toilet;ramp;wheelchair;hospital bed;other (see comments)   Lift chair    Equipment Needed After Discharge walker, standard            Discharge Plan       12/01/17 1244    Case Management/Social Work Plan    Plan Home    Patient/Family In Agreement With Plan yes    Additional Comments Spoke with pt in room regarding DCP; she is unable to answer questions appropriately, so family contacted.  Called grandson Hira \"Gael\" Shady at [phone].  Per Gael, he has guardianship over his grandmother.  He will bring papers to the hospital, as soon as he is able, as he is currently out of town.  Gael states that pt lives with him in a basement apartment.  He states that pt's son, Arjun (his father), also lives with her, but he has dementia, as well.  He states that pt is followed by [facility] for primary care.  She has 24/7 care provided by him, his " wife, his children, and paid caregivers.  Gael denies HH.  He states okay for pt to return home. Family provides transportation.  Pt's apartment area is handicapped accessible.  He has a ramp, for pt is she goes to the upstairs portion of the house.  Pt is able to ambulate with a rollator, but needs someone beside her.  Additionally, she has a hospital bed, lift chair, transfer WC, shower chair, and raised toilet seat.  Rx's are filled at [facility].  Address, phone and PCP verified and correct as needed.  CM will continue to follow and assist with discharge as needed.        Discharge Placement     No information found        Expected Discharge Date and Time     Expected Discharge Date Expected Discharge Time    Dec 2, 2017               Demographic Summary       12/01/17 1239    Referral Information    Admission Type observation    Arrived From home or self-care    Referral Source admission list    Reason For Consult discharge planning    Record Reviewed history and physical;medical record    Primary Care Physician Information    Name LEENA - CARLEEN Echevarria            Functional Status       12/01/17 1240    Functional Status Prior    Ambulation 1-->assistive equipment    Transferring 2-->assistive person    Toileting 1-->assistive equipment    Bathing 2-->assistive person    Dressing 2-->assistive person    Eating 0-->independent    Communication 0-->understands/communicates without difficulty    Swallowing 0-->swallows foods/liquids without difficulty    IADL    Medications assistive person    Meal Preparation assistive person    Housekeeping assistive person    Laundry assistive person    Shopping assistive person    Oral Care independent            Psychosocial     None            Abuse/Neglect     None            Legal     None            Substance Abuse     None            Patient Forms     None          Vianey Newsome

## 2017-12-01 NOTE — THERAPY EVALUATION
Acute Care - Physical Therapy Initial Evaluation  [facility]     Patient Name: Lotus Caruso  : 1923  MRN: [id number]  Today's Date: 2017   Onset of Illness/Injury or Date of Surgery Date: (P) 17  Date of Referral to PT: (P) 17  Referring Physician: (P) Dr. Bear      Admit Date: 2017     Visit Dx:    ICD-10-CM ICD-9-CM   1. Syncope, unspecified syncope type R55 780.2   2. Vomiting, intractability of vomiting not specified, presence of nausea not specified, unspecified vomiting type R11.10 787.03   3. Impaired functional mobility, balance, gait, and endurance Z74.09 V49.89     Patient Active Problem List   Diagnosis   • Syncope   • Multi-infarct dementia   • Mild anemia     Past Medical History:   Diagnosis Date   • ACS (acute coronary syndrome)    • Dementia    • Pneumonia    • Seizures    • Stroke    • TIA (transient ischemic attack)    • UTI (urinary tract infection)      Past Surgical History:   Procedure Laterality Date   • HYSTERECTOMY            PT ASSESSMENT (last 72 hours)      PT Evaluation       17 1240 17 1100    Rehab Evaluation    Document Type  (P)  evaluation  -    Subjective Information  (P)  no complaints;agree to therapy  -    Patient Effort, Rehab Treatment  (P)  adequate  -    Symptoms Noted During/After Treatment  (P)  none  -    General Information    Patient Profile Review  (P)  yes  -    Onset of Illness/Injury or Date of Surgery Date  (P)  17  -    Referring Physician  (P)  Dr. Bear  -    General Observations  (P)  Pt was supine in bed when PT arrived.  -    Pertinent History Of Current Problem  (P)  dementia, syncope, seizures, CVA, Anemia, ACS  -    Precautions/Limitations  (P)  no known precautions/limitations  -    Prior Level of Function  (P)  --   Difficult to obtain info d/t pt's mental status   -    Equipment Currently Used at Home rollator;shower chair;raised toilet;ramp;wheelchair;hospital bed;other  (see comments)   Lift chair  -LT (P)  walker, rolling  -    Plans/Goals Discussed With  (P)  patient;agreed upon  -    Risks Reviewed  (P)  patient:;increased discomfort  -AH    Benefits Reviewed  (P)  patient:;improve function;increase independence;increase strength;increase balance  -    Barriers to Rehab  (P)  cognitive status  -    Living Environment    Lives With child(zaina), adult;other (see comments)   Adult grandson  -LT (P)  child(zaina), adult;other relative(s) (specify)   grandson and great grandson  -AH    Living Arrangements house  -LT (P)  house  -AH    Home Accessibility bed and bath on same level;grab bars present (bathtub);grab bars present (toilet);ramps present at home  -LT (P)  bed and bath are not on the first floor  -    Stair Railings at Home  (P)  none  -AH    Type of Financial/Environmental Concern none  -LT (P)  none  -AH    Transportation Available family or friend will provide  -LT     Clinical Impression    Date of Referral to PT  (P)  12/01/17  -    PT Diagnosis  (P)  impaired balanance and coordination, functional mobility, and transfers  -    Prognosis  (P)  good to meet stated goals  -    Criteria for Skilled Therapeutic Interventions Met  (P)  yes;treatment indicated  -    Pathology/Pathophysiology Noted (Describe Specifically for Each System)  (P)  musculoskeletal;neuromuscular;cardiovascular  -    Impairments Found (describe specific impairments)  (P)  aerobic capacity/endurance;arousal, attention, and cognition;gait, locomotion, and balance  -    Functional Limitations in Following Categories (Describe Specific Limitations)  (P)  self-care;home management;community/leisure  -    Disability: Inability to Perform Actions/Activities of Required Roles (describe specific disability)  (P)  community/leisure  -    Risk Reduction/Prevention (Describe Specific Areas of risk reduction/prevention)  (P)  secondary impairments  -    Rehab Potential  (P)  good, to  achieve stated therapy goals  -    Pain Assessment    Pain Assessment  (P)  No/denies pain  -    Cognitive Assessment/Intervention    Current Cognitive/Communication Assessment  (P)  impaired  -    Orientation Status  (P)  oriented to;person  -    Follows Commands/Answers Questions  (P)  able to follow single-step instructions;needs repetition  -    Personal Safety  (P)  decreased awareness, need for assist;decreased awareness, need for safety;unaware of cognitive deficits  -    Personal Safety Interventions  (P)  fall prevention program maintained;gait belt;nonskid shoes/slippers when out of bed  -    Short/Long Term Memory  (P)  requires frequent cues  -    ROM (Range of Motion)    General ROM  (P)  no range of motion deficits identified  -    MMT (Manual Muscle Testing)    General MMT Assessment  (P)  lower extremity strength deficits identified  -    General MMT Assessment Detail  (P)  3/5  -    Bed Mobility, Assessment/Treatment    Bed Mobility, Assistive Device  (P)  bed rails;head of bed elevated  -    Bed Mob, Supine to Sit, Grethel  (P)  minimum assist (75% patient effort)  -    Bed Mob, Sit to Supine, Grethel  (P)  minimum assist (75% patient effort)  -    Bed Mobility, Safety Issues  (P)  cognitive deficits limit understanding;decreased use of arms for pushing/pulling;decreased use of legs for bridging/pushing  -    Bed Mobility, Impairments  (P)  strength decreased;impaired balance;coordination impaired  -    Transfer Assessment/Treatment    Transfers, Sit-Stand Grethel  (P)  moderate assist (50% patient effort);2 person assist required;verbal cues required  -    Transfers, Stand-Sit Grethel  (P)  moderate assist (50% patient effort);2 person assist required  -    Transfers, Sit-Stand-Sit, Assist Device  (P)  rolling walker  -    Transfer, Safety Issues  (P)  loses balance backward;knees buckling;weight-shifting ability decreased  -    Transfer,  Impairments  (P)  strength decreased;impaired balance;coordination impaired  -    Positioning and Restraints    Pre-Treatment Position  (P)  in bed  -    Post Treatment Position  (P)  bed  -    In Bed  (P)  supine;call light within reach;encouraged to call for assist;exit alarm on  -      12/01/17 0200       General Information    Equipment Currently Used at Home walker, rolling  -EB     Living Environment    Lives With child(zaina), adult;other relative(s) (specify)   Grandson and great-grandson  -     Living Arrangements house  -EB     Home Accessibility bed and bath are not on the first floor  -     Stair Railings at Home none  -EB     Type of Financial/Environmental Concern none  -EB     Transportation Available car  -       User Key  (r) = Recorded By, (t) = Taken By, (c) = Cosigned By    Initials Name Provider Type     Jennie Mancia, RN Registered Nurse     Vianey Newsome      Mariposa Norwood, PT Student PT Student          Physical Therapy Education     Title: PT OT SLP Therapies (Active)     Topic: Physical Therapy (Active)     Point: Mobility training (Active)    Learning Progress Summary    Learner Readiness Method Response Comment Documented by Status   Patient Acceptance E NR PT benefits and POC.  12/01/17 1347 Active               Point: Body mechanics (Active)    Learning Progress Summary    Learner Readiness Method Response Comment Documented by Status   Patient Acceptance E NR PT benefits and POC.  12/01/17 1347 Active                      User Key     Initials Effective Dates Name Provider Type Replaced by [facility] 11/01/17 -  Mariposa Norwood, PT Student PT Student PT                PT Recommendation and Plan  Planned Therapy Interventions: (P) balance training, bed mobility training, gait training, home exercise program, patient/family education, strengthening, transfer training  PT Frequency: (P) daily  Plan of Care Review  Plan Of Care Reviewed With: (P) patient  Outcome  Summary/Follow up Plan: (P) Pt. presents with impaired strength and endurance, functional mobility, and transfers. She was very pleasant to work with and kept asking the same questions over. She did a sit to stand tf with mod A x 2 and is expected to benefit from skilled PT to improve functional ind prior to d/c.          IP PT Goals       12/01/17 1347          Bed Mobility PT LTG    Bed Mobility PT LTG, Date Established (P)  12/01/17  -      Bed Mobility PT LTG, Time to Achieve (P)  2 wks  -      Bed Mobility PT LTG, Activity Type (P)  all bed mobility  -      Bed Mobility PT LTG, [place] Level (P)  supervision required;verbal cues required  -      Transfer Training PT LTG    Transfer Training PT LTG, Date Established (P)  12/01/17  -      Transfer Training PT LTG, Time to Achieve (P)  2 wks  -      Transfer Training PT LTG, Activity Type (P)  bed to chair /chair to bed;sit to stand/stand to sit  -      Transfer Training PT LTG, [place] Level (P)  minimum assist (75% patient effort)  -      Transfer Training PT LTG, Assist Device (P)  walker, rolling  -      Gait Training PT LTG    Gait Training Goal PT LTG, Date Established (P)  12/01/17  -      Gait Training Goal PT LTG, Time to Achieve (P)  2 wks  -      Gait Training Goal PT LTG, [place] Level (P)  minimum assist (75% patient effort)  -      Gait Training Goal PT LTG, Assist Device (P)  walker, rolling  -      Gait Training Goal PT LTG, Distance to Achieve (P)  5  -      Gait Training Goal PT LTG, Additional Goal (P)  to be able to amb short distances from bed to chair  -        User Key  (r) = Recorded By, (t) = Taken By, (c) = Cosigned By    Initials Name Provider Type    CRYSTAL Norwood, PT Student PT Student                Outcome Measures       12/01/17 1100          How much help from another person do you currently need...    Turning from your back to your side while in flat bed without using bedrails? (P)  3   -AH      Moving from lying on back to sitting on the side of a flat bed without bedrails? (P)  3  -AH      Moving to and from a bed to a chair (including a wheelchair)? (P)  2  -AH      Standing up from a chair using your arms (e.g., wheelchair, bedside chair)? (P)  2  -AH      Climbing 3-5 steps with a railing? (P)  1  -AH      To walk in hospital room? (P)  1  -AH      AM-PAC 6 Clicks Score (P)  12  -      Functional Assessment    Outcome Measure Options (P)  AM-PAC 6 Clicks Basic Mobility (PT)  -        User Key  (r) = Recorded By, (t) = Taken By, (c) = Cosigned By    Initials Name Provider Type     Mariposa Norwood PT Student PT Student           Time Calculation:         PT Charges       12/01/17 1351          Time Calculation    Start Time (P)  1100  -      PT Received On (P)  12/01/17  -      PT Goal Re-Cert Due Date (P)  12/11/17  -        User Key  (r) = Recorded By, (t) = Taken By, (c) = Cosigned By    Initials Name Provider Type     Mariposa Norwood PT Student PT Student          Therapy Charges for Today     Code Description Service Date Service Provider Modifiers Qty    [id number] HC PT EVAL MOD COMPLEXITY 4 12/1/2017 Mariposa Norwood PT Student GP 1          PT G-Codes  Outcome Measure Options: (P) AM-PAC 6 Clicks Basic Mobility (PT)      Mariposa Norwood PT Student  12/1/2017

## 2017-12-01 NOTE — PLAN OF CARE
Problem: Patient Care Overview (Adult)  Goal: Plan of Care Review  Outcome: Ongoing (interventions implemented as appropriate)    12/01/17 0311   Coping/Psychosocial Response Interventions   Plan Of Care Reviewed With patient   Patient Care Overview   Progress no change   Outcome Evaluation   Outcome Summary/Follow up Plan Patient new admission from ED. Vital signs stable. Patient has no complaints at this time. Patient's POA is currently in [place], not due back until Saturday. Family at patient's bedside are unaware of patient's home medication and medical history. Currently awaiting orders from MD on call.       Goal: Adult Individualization and Mutuality  Outcome: Ongoing (interventions implemented as appropriate)    Problem: Fall Risk (Adult)  Goal: Identify Related Risk Factors and Signs and Symptoms  Outcome: Ongoing (interventions implemented as appropriate)  Goal: Absence of Falls  Outcome: Ongoing (interventions implemented as appropriate)    Problem: Confusion, Chronic (Adult)  Goal: Identify Related Risk Factors and Signs and Symptoms  Outcome: Ongoing (interventions implemented as appropriate)  Goal: Cognitive/Functional Impairments Minimized  Outcome: Ongoing (interventions implemented as appropriate)  Goal: Free from Harm/Injuries  Outcome: Ongoing (interventions implemented as appropriate)

## 2017-12-01 NOTE — H&P
Active Problems:    Syncope    Multi-infarct dementia          Assessment/Plan     An episode of staring off into space is to be therefore seen by neurology consider seizure activity        Chief complaint   Loss of consciousness  Subjective     89-year-old white female was brought to the emergency department by her son in great grandson.  There had been a history of several episodes of vomiting.  Of approximately 3 minutes which she stared off into space and had fecal incontinence no clonic tonic seizure activity was described no local neurologic abnormalities were described no fever or chest pain complaints were described.  There is a long history of dementia chart review reveals history of seizure TIA and stroke as well.  There is a grandson who typically cares for her and is POA who is currently vacationing in [place] is to return and a couple of days.  There is an advanced directive document but is not clear what it entails.      Review of Systems   I obtained the history from the patient as no family members are present at this time her answers of course are questionable.  She denies fever sweats chills.  Headache earache sore throat are denied as well.  She denies chest pain shortness of breath cough.  Denies abdominal pain diarrhea constipation melena or hematochezia.  She denies dysuria or hematuria.    History  Past Medical History:   Diagnosis Date   • ACS (acute coronary syndrome)    • Dementia    • Pneumonia    • Seizures    • Stroke    • TIA (transient ischemic attack)    • UTI (urinary tract infection)    She was evaluated for a fall in September of this year no fracture was identified there was a scalp abrasion and hematoma.  She was seen later same month with left lower lobe pneumonia which was treated as an outpatient.  Past Surgical History:   Procedure Laterality Date   • HYSTERECTOMY       History reviewed. No pertinent family history.  Social History     Social History   • Marital status:       Spouse name: N/A   • Number of children: N/A   • Years of education: N/A     Occupational History   • Not on file.     Social History Main Topics   • Smoking status: Never Smoker   • Smokeless tobacco: Never Used   • Alcohol use No   • Drug use: No   • Sexual activity: Not on file     Other Topics Concern   • Not on file     Social History Narrative           Objective     Vital Signs  Temp:  [97.5 °F (36.4 °C)-97.7 °F (36.5 °C)] 97.7 °F (36.5 °C)  Heart Rate:  [55-64] 60  Resp:  [16-18] 16  BP: (111-149)/(49-77) 134/77    Physical Exam:       General Appearance:    Alert, cooperative, in no acute distress   Head:    Normocephalic, without obvious abnormality, atraumatic   Eyes:            Lids and lashes normal, conjunctivae and sclerae normal, no   icterus, no pallor   Ears:    Ears appear intact with no abnormalities noted   Throat:   No oral lesions, no thrush, oral mucosa moist   Neck:   No adenopathy, supple, trachea midline, no thyromegaly, no   carotid bruit   Back:     No kyphosis present, no scoliosis present, no tenderness    Lungs:     Clear to auscultation,respirations regular, even and                  unlabored    Heart:    Regular rhythm and normal rate, normal S1 and S2, no           2-3/6 systolic murmur, no gallop, no rub, no click   Chest Wall:    No abnormalities observed   Abdomen:     Normal bowel sounds, no masses, no organomegaly, soft        non-tender, non-distended, no guarding, no rebound                tenderness   Rectal:        Extremities:   No edema, no cyanosis   Pulses:   Pulses diminished feet.   Skin:   No bleeding or rash, bruising pretibial areas noted.   Lymph nodes:   No palpable adenopathy   Neurologic:   Cranial nerves 2 - 12 grossly intact       Rasta Bear MD  12/01/17  4:16 AM

## 2017-12-01 NOTE — PROGRESS NOTES
[facility]   Progress note        I have reviewed labs/imaging/records from this hospitalization, including ER staff and admitting/attending physicians H/P's and progress notes to establish a comprehensive understanding of this patient's clinical hospital course, as well as to establish plan of care appropriately.      Patient seen and examined this morning.  Patient was admitted earlier today, she is demented.  Continue current treatment plan and reassess again in on day-to-day basis for any further changes in plan or interventions.      Assessment/Problem List:   Active Problems:    Syncope    Multi-infarct dementia    Mild anemia    Stroke    Dementia    Other specified cardiac arrhythmias (CODE)      Plan:  I've consulted cardiology for cardiac issues, rest of the plans have been as per initial evaluation by Dr. Bear.  Patient will not be a candidate for more aggressive measures once the family becomes available we will need further discussions about plan of care.  Palliative care consultation is appropriate for her.  Details were discussed with the patient, there is no family in the room.   Further recommendations will depend on clinical course of the patient during the current hospitalization.      I also discussed the details with the nursing staff.    Rest as ordered.            Tremaine Vasquez MD  12/01/17  2:50 PM

## 2017-12-01 NOTE — PLAN OF CARE
Problem: Patient Care Overview (Adult)  Goal: Plan of Care Review  Outcome: Ongoing (interventions implemented as appropriate)    12/01/17 2143   Coping/Psychosocial Response Interventions   Plan Of Care Reviewed With patient   Patient Care Overview   Progress no change         Problem: Fall Risk (Adult)  Goal: Identify Related Risk Factors and Signs and Symptoms  Outcome: Ongoing (interventions implemented as appropriate)    Problem: Confusion, Chronic (Adult)  Goal: Identify Related Risk Factors and Signs and Symptoms  Outcome: Ongoing (interventions implemented as appropriate)

## 2017-12-01 NOTE — PLAN OF CARE
Problem: Patient Care Overview (Adult)  Goal: Plan of Care Review  Outcome: Ongoing (interventions implemented as appropriate)    12/01/17 1347   Coping/Psychosocial Response Interventions   Plan Of Care Reviewed With patient   Outcome Evaluation   Outcome Summary/Follow up Plan Pt. presents with impaired strength and endurance, functional mobility, and transfers. She was very pleasant to work with and kept asking the same questions over. She did a sit to stand tf with mod A x 2 and is expected to benefit from skilled PT to improve functional ind prior to d/c.         Problem: Inpatient Physical Therapy  Goal: Bed Mobility Goal LTG- PT  Outcome: Ongoing (interventions implemented as appropriate)    12/01/17 1347   Bed Mobility PT LTG   Bed Mobility PT LTG, Date Established 12/01/17   Bed Mobility PT LTG, Time to Achieve 2 wks   Bed Mobility PT LTG, Activity Type all bed mobility   Bed Mobility PT LTG, [place] Level supervision required;verbal cues required       Goal: Transfer Training Goal 1 LTG- PT  Outcome: Ongoing (interventions implemented as appropriate)    12/01/17 1347   Transfer Training PT LTG   Transfer Training PT LTG, Date Established 12/01/17   Transfer Training PT LTG, Time to Achieve 2 wks   Transfer Training PT LTG, Activity Type bed to chair /chair to bed;sit to stand/stand to sit   Transfer Training PT LTG, [place] Level minimum assist (75% patient effort)   Transfer Training PT LTG, Assist Device walker, rolling       Goal: Gait Training Goal LTG- PT  Outcome: Ongoing (interventions implemented as appropriate)

## 2017-12-02 VITALS
RESPIRATION RATE: 18 BRPM | OXYGEN SATURATION: 97 % | WEIGHT: 143.8 LBS | HEART RATE: 78 BPM | SYSTOLIC BLOOD PRESSURE: 128 MMHG | DIASTOLIC BLOOD PRESSURE: 48 MMHG | BODY MASS INDEX: 26.46 KG/M2 | TEMPERATURE: 98 F | HEIGHT: 62 IN

## 2017-12-02 LAB — BACTERIA SPEC AEROBE CULT: NO GROWTH

## 2017-12-02 PROCEDURE — 25010000002 ENOXAPARIN PER 10 MG: Performed by: INTERNAL MEDICINE

## 2017-12-02 PROCEDURE — 99217 PR OBSERVATION CARE DISCHARGE MANAGEMENT: CPT | Performed by: INTERNAL MEDICINE

## 2017-12-02 PROCEDURE — 96372 THER/PROPH/DIAG INJ SC/IM: CPT

## 2017-12-02 PROCEDURE — G0378 HOSPITAL OBSERVATION PER HR: HCPCS

## 2017-12-02 RX ORDER — METOPROLOL SUCCINATE 25 MG/1
25 TABLET, EXTENDED RELEASE ORAL
Status: DISCONTINUED | OUTPATIENT
Start: 2017-12-02 | End: 2017-12-02 | Stop reason: HOSPADM

## 2017-12-02 RX ADMIN — METOPROLOL SUCCINATE 25 MG: 25 TABLET, EXTENDED RELEASE ORAL at 09:17

## 2017-12-02 RX ADMIN — ASPIRIN 325 MG ORAL TABLET 162 MG: 325 PILL ORAL at 09:15

## 2017-12-02 RX ADMIN — ENOXAPARIN SODIUM 30 MG: 30 INJECTION SUBCUTANEOUS at 06:39

## 2017-12-02 RX ADMIN — FLUOXETINE 20 MG: 20 CAPSULE ORAL at 09:14

## 2017-12-02 RX ADMIN — LEVOTHYROXINE SODIUM 25 MCG: 25 TABLET ORAL at 06:39

## 2017-12-02 NOTE — DISCHARGE SUMMARY
[facility]   DISCHARGE SUMMARY      Name:  Lotus Caruso   Age:  89 y.o.  Sex:  female  :  1923  MRN:  [id number]   Visit Number:  [id number]  Primary Care Physician:  CARLENE Jay  Date of Discharge:  2017  Admission Date:  2017      Discharge Diagnosis:       Active Problems:    Syncope    Multi-infarct dementia    Mild anemia    Stroke    Dementia    Other specified cardiac arrhythmias (CODE)          Consults:   Consulting Physician(s)     Provider Relationship Specialty    Casey Mcfarland MD Consulting Physician Cardiology        Consults     Date and Time Order Name Status Description    2017 0811 Inpatient Consult to Cardiology      2017 0413 Inpatient Consult to Neurology            Procedures Performed:               H&P:   The patient was admitted on 2017  Please see H&P for details on admission HPI and ROS.  89-year-old white female was brought to the emergency department by her son in great grandson.  There had been a history of several episodes of vomiting.  Of approximately 3 minutes which she stared off into space and had fecal incontinence no clonic tonic seizure activity was described no local neurologic abnormalities were described no fever or chest pain complaints were described.  There is a long history of dementia chart review reveals history of seizure TIA and stroke as well.  There is a grandson who typically cares for her and is POA who is currently vacationing in [place] is to return and a couple of days.  There is an advanced directive document but is not clear what it entails.    Hospital Course:  Patient was admitted because of some rhythm issues cardiology was consulted and echo was performed and read by Dr. Mcfarland.  It showed as follows  · Left ventricular wall thickness is consistent with moderate concentric hypertrophy.  · Left ventricular diastolic dysfunction (grade II) consistent with  pseudonormalization.  · Left atrial cavity size is moderately dilated.  · Severe aortic valve stenosis is present.  · Aortic valve maximum pressure gradient is 74 mmHg.  · Aortic valve mean pressure gradient is 46 mmHg.  · Mild aortic valve regurgitation is present.  · Moderate mitral valve regurgitation is present  · Moderate tricuspid valve regurgitation is present.  · Calculated right ventricular systolic pressure from tricuspid regurgitation is 48 mmHg.    Details were discussed with the family about her heart condition and they're fairly comfortable that she is been like this for a while and do not want any aggressive measures done.  She has a living will and grandson is the POA was very clear about her DNR status.  His wife is a nurse and she is also very comfortable with her condition and plan.    Her home medications were resumed, no follow-up with any specialty was scheduled.  She'll continue to follow-up with the primary care as well as comfort care.    Vital Signs:    Temp:  [98 °F (36.7 °C)-98.6 °F (37 °C)] 98 °F (36.7 °C)  Heart Rate:  [72-79] 78  Resp:  [18-20] 18  BP: (128-146)/() 128/48            Physical Exam:   General Appearance: alert, no acute distress,   HEENT: pupils round and reactive to light, oral mucosa dry, extra occular movements intact.  Neck: supple, no JVD, trachea midline  Lungs: Clear to Auscultation, unlabored breathing effort  Heart: RRR, normal S1 and S2, no S3, no rub, she does have fairly sharp systolic murmur  Abdomen: soft, non-tender, no palpable bladder, present bowel sounds to auscultation  Extremities: no edema, cyanosis or clubbing.   Neuro: normal speech and mental status, grossly non focal.    Pertinent Lab Results:       Results from last 7 days  Lab Units 11/30/17  2320   SODIUM mmol/L 137   POTASSIUM mmol/L 3.5   CHLORIDE mmol/L 103   CO2 mmol/L 22.0*   BUN mg/dL 32*   CREATININE mg/dL 1.00   CALCIUM mg/dL 9.0   BILIRUBIN mg/dL 0.4   ALK PHOS U/L 89   ALT  "(SGPT) U/L 22   AST (SGOT) U/L 25   GLUCOSE mg/dL 154*       Results from last 7 days  Lab Units 11/30/17  2320   WBC 10*3/mm3 7.05   HEMOGLOBIN g/dL 10.8*   HEMATOCRIT % 33.2*   PLATELETS 10*3/mm3 214         Urine Culture   Date Value Ref Range Status   12/01/2017 No growth  Final       Pertinent Radiology Results:  Imaging Results (most recent)     Procedure Component Value Units Date/Time    XR Chest 2 View [[id number]] Collected:  12/01/17 0724     Updated:  12/01/17 0733    Narrative:       PROCEDURE: XR CHEST 2 VW-     HISTORY: ?unresponsive episode, vomiting     COMPARISON: September 13, 2017.     FINDINGS: Electrodes overlie the chest. Atherosclerosis is noted. The  heart is normal in size. The mediastinum is unremarkable. Similar  appearance is seen to the left lower lobe which may represent  atelectasis versus pneumonia. There is no pneumothorax.  There are no  acute osseous abnormalities.           Impression:       Similar appearance is seen to the left lower lobe which may  represent atelectasis versus pneumonia.     Continued followup is recommended.     This report was finalized on 12/1/2017 7:29 AM by Sang Harris DO.    CT Head Without Contrast [[id number]] Collected:  12/01/17 0730     Updated:  12/01/17 0735    Narrative:       PROCEDURE: CT HEAD WO CONTRAST-     HISTORY: unresponsive episode, vomiting, \"weakness\"     COMPARISON: September 3, 2017.     TECHNIQUE: Multiple axial CT images were performed from the foramen  magnum to the vertex without enhancement.      FINDINGS: There is no CT evidence of hemorrhage. There is no mass, mass  effect or midline shift.  Cerebral atrophy is noted, similar to prior.  There are nonspecific periventricular white matter changes. Mild mucosal  thickening in the paranasal sinuses. Bone windows reveal no acute  osseous abnormalities.       Impression:       No acute intracranial process.             775.26 mGy.cm          This study was performed with techniques " to keep radiation doses as low  as reasonably achievable (ALARA). Individualized dose reduction  techniques using automated exposure control or adjustment of mA and/or  kV according to the patient size were employed.      This report was finalized on 12/1/2017 7:32 AM by Sang Harris DO.                  Discharge Disposition:    Home or Self Care    Discharge Medication:     Lotus Caruso   Home Medication Instructions JATIN:[id number]    Printed on:12/02/17 1255   Medication Information                      FLUoxetine (PROzac) 20 MG capsule  Take 20 mg by mouth Daily.             levothyroxine (SYNTHROID, LEVOTHROID) 25 MCG tablet  Take 25 mcg by mouth Daily.             loratadine (CLARITIN) 10 MG tablet  Take 10 mg by mouth Daily.             omeprazole (priLOSEC) 40 MG capsule  Take 40 mg by mouth Daily.             QUEtiapine (SEROquel) 25 MG tablet  Take 12.5 mg by mouth Every Night.             vitamin D (ERGOCALCIFEROL) 84858 units capsule capsule  Take 50,000 Units by mouth 1 (One) Time Per Week.                 Discharge Diet:     Diet Instructions     Diet: Regular       Discharge Diet:  Regular                     Follow-up Appointments:    No future appointments.      Test Results Pending at Discharge:     Order Current Status    Methylmalonic Acid, Serum In process             Tremaine Vasquez MD  12/02/17  4:26 PM    Time: Discharge 40 min

## 2017-12-02 NOTE — PROGRESS NOTES
Cardiology Progress Note  Casey Mcfarland MD  PATIENT: Ltous Caruso : 1923   DATE: 17   312/1  Reason for the visit: Possible paroxysmal dysrhythmia    Patient Care Team:  CARLENE Monteiro as PCP - General (Family Medicine)    Subjective .  Patient has stayed hemodynamically stable and appears to be alert and responsive.  Patient does appear to have short runs of paroxysmal atrial fibrillation but she has not had any sustained ventricular ectopy.  Patient has been remarkably free of additional symptomatology.    Objective     Vital Sign Min/Max for last 24 hours  Temp  Min: 94.6 °F (34.8 °C)  Max: 98.6 °F (37 °C)   BP  Min: 121/66  Max: 146/66   Pulse  Min: 70  Max: 79   Resp  Min: 18  Max: 20   SpO2  Min: 94 %  Max: 100 %   No Data Recorded   No Data Recorded     Wt Readings from Last 7 Encounters:   17 143 lb 12.8 oz (65.2 kg)   17 158 lb (71.7 kg)   17 158 lb (71.7 kg)          Physical Exam:    CONSTITUTIONAL: Not in acute distress.    NECK: Carotid upstroke is normal, there are no carotid bruits, no JVD, no thyromegaly, no cervical or axillary lymphadenopathy.     HEART: [place] beat is not displaced, no thrill is appreciated and both heart sounds are normal.  Patient has ejection murmur at aortic area with radiation into the carotids.     BRONCHOPULMONARY: Patient has good air entry bilaterally with bronchovesicular sounds. No adventious sounds audible.     GI & : Soft, no tenderness elicited and no viscera are palpable. Bowel sounds are positive and there is no renal bruits audible.       Intake/Output Summary (Last 24 hours) at 17 0836  Last data filed at 17 1244   Gross per 24 hour   Intake              360 ml   Output                0 ml   Net              360 ml       Results Review:    LABS:     Results from last 7 days  Lab Units 17  2320   WBC 10*3/mm3 7.05   HEMOGLOBIN g/dL 10.8*   HEMATOCRIT % 33.2*   PLATELETS 10*3/mm3 214     I reviewed the  patient's new clinical results.    Results from last 7 days  Lab Units 11/30/17  2320   WBC 10*3/mm3 7.05   HEMOGLOBIN g/dL 10.8*   HEMATOCRIT % 33.2*   PLATELETS 10*3/mm3 214       Results from last 7 days  Lab Units 11/30/17  2320   SODIUM mmol/L 137   POTASSIUM mmol/L 3.5   CHLORIDE mmol/L 103   CO2 mmol/L 22.0*   BUN mg/dL 32*   CREATININE mg/dL 1.00   GLUCOSE mg/dL 154*   CALCIUM mg/dL 9.0       Results from last 7 days  Lab Units 11/30/17  2320   TROPONIN I ng/mL <0.012              No results found for: HGBA1C    Results from last 7 days  Lab Units 12/01/17  0605   TSH mIU/mL 6.440*       Results from last 7 days  Lab Units 11/30/17  2320   LIPASE U/L 102       RADIOLOGY: Imaging Results (last 24 hours)     ** No results found for the last 24 hours. **                 No Known Allergies    aspirin 162 mg Oral Daily   enoxaparin 30 mg Subcutaneous Q24H   FLUoxetine 20 mg Oral Daily   levothyroxine 25 mcg Oral Daily   QUEtiapine 12.5 mg Oral Nightly        Active Problems:    Syncope    Multi-infarct dementia    Mild anemia    Stroke    Dementia    Other specified cardiac arrhythmias (CODE)    Assessment/Plan     1. Patient, who appears to have multiple substrates for atrial dysrhythmia, was noticed to have more frequent ectopy predominantly atrial with possible intermittent paroxysmal atrial fibrillation.  She has not had any sustained dysrhythmias.  Since hospitalization, patient has not had any significant bradyarrhythmias or pauses.  She apparently has sustained multiple falls but possibly has never lost consciousness though data is very limited.  It was felt that patient may benefit from low-dose beta blocker therapy.  She does appear to have severe aortic stenosis and pulmonary hypertension as potential future mechanisms of atrial dysrhythmia.  2. Patient has aortic stenosis which appears to be severe.  Given her limited lifestyle and her senility, patient would not merit surgical intervention with AVR or  TAVR.  Contribution from fixed cardiac output leading to potential future dizziness certainly would be in differential diagnosis.  3. Patient has pulmonary hypertension which conceivably may reflect left-sided issues.  Given, however, her senility, she conceivably may have sleep-disordered breathing which may have played a contributory role in paroxysmal atrial dysrhythmia as well as underlying mechanism.      Casey Mcfarland MD  12/02/17  8:36 AM

## 2017-12-02 NOTE — PLAN OF CARE
Problem: Patient Care Overview (Adult)  Goal: Plan of Care Review  Outcome: Ongoing (interventions implemented as appropriate)    12/02/17 1308   Coping/Psychosocial Response Interventions   Plan Of Care Reviewed With patient   Patient Care Overview   Progress no change         Problem: Fall Risk (Adult)  Goal: Identify Related Risk Factors and Signs and Symptoms  Outcome: Ongoing (interventions implemented as appropriate)    Problem: Confusion, Chronic (Adult)  Goal: Identify Related Risk Factors and Signs and Symptoms  Outcome: Ongoing (interventions implemented as appropriate)

## 2017-12-02 NOTE — PLAN OF CARE
Problem: Patient Care Overview (Adult)  Goal: Plan of Care Review  Outcome: Ongoing (interventions implemented as appropriate)    12/02/17 0316   Coping/Psychosocial Response Interventions   Plan Of Care Reviewed With patient   Patient Care Overview   Progress no change   Outcome Evaluation   Outcome Summary/Follow up Plan Patient continues to be confused. She has pulled her IV out and her telemetry leads numours times.       Goal: Adult Individualization and Mutuality  Outcome: Ongoing (interventions implemented as appropriate)  Goal: Discharge Needs Assessment  Outcome: Ongoing (interventions implemented as appropriate)    Problem: Fall Risk (Adult)  Goal: Identify Related Risk Factors and Signs and Symptoms  Outcome: Ongoing (interventions implemented as appropriate)  Goal: Absence of Falls  Outcome: Ongoing (interventions implemented as appropriate)

## 2017-12-04 NOTE — PROGRESS NOTES
Case Management Discharge Note         Discharge Placement     No information found        Other: Other (Private vehicle)    Discharge Codes: 01  Discharge to home

## 2017-12-05 PROCEDURE — G8978 MOBILITY CURRENT STATUS: HCPCS

## 2017-12-05 PROCEDURE — G8980 MOBILITY D/C STATUS: HCPCS

## 2017-12-05 PROCEDURE — G8979 MOBILITY GOAL STATUS: HCPCS

## 2017-12-05 NOTE — THERAPY DISCHARGE NOTE
Acute Care - Physical Therapy Discharge Summary   [facility]       Patient Name: Lotus Caruso  : 1923  MRN: [id number]    Today's Date: 2017  Onset of Illness/Injury or Date of Surgery Date: 17    Date of Referral to PT: 17  Referring Physician: Dr. Bear      Admit Date: 2017      PT Recommendation and Plan    Visit Dx:    ICD-10-CM ICD-9-CM   1. Syncope, unspecified syncope type R55 780.2   2. Vomiting, intractability of vomiting not specified, presence of nausea not specified, unspecified vomiting type R11.10 787.03   3. Impaired functional mobility, balance, gait, and endurance Z74.09 V49.89                       IP PT Goals       17 1347          Bed Mobility PT LTG    Bed Mobility PT LTG, Date Established 17  -LM (r) AH (t) LM (c)      Bed Mobility PT LTG, Time to Achieve 2 wks  -LM (r) AH (t) LM (c)      Bed Mobility PT LTG, Activity Type all bed mobility  -LM (r) AH (t) LM (c)      Bed Mobility PT LTG, [place] Level supervision required;verbal cues required  -LM (r) AH (t) LM (c)      Transfer Training PT LTG    Transfer Training PT LTG, Date Established 17  -LM (r) AH (t) LM (c)      Transfer Training PT LTG, Time to Achieve 2 wks  -LM (r) AH (t) LM (c)      Transfer Training PT LTG, Activity Type bed to chair /chair to bed;sit to stand/stand to sit  -LM (r) AH (t) LM (c)      Transfer Training PT LTG, [place] Level minimum assist (75% patient effort)  -LM (r) AH (t) LM (c)      Transfer Training PT LTG, Assist Device walker, rolling  -LM (r) AH (t) LM (c)      Gait Training PT LTG    Gait Training Goal PT LTG, Date Established 17  -LM (r) AH (t) LM (c)      Gait Training Goal PT LTG, Time to Achieve 2 wks  -LM (r) AH (t) LM (c)      Gait Training Goal PT LTG, [place] Level minimum assist (75% patient effort)  -LM (r) AH (t) LM (c)      Gait Training Goal PT LTG, Assist Device walker, rolling  -LM (r) AH (t) LM (c)      Gait Training  Goal PT LTG, Distance to Achieve 5  -LM (r) AH (t) LM (c)      Gait Training Goal PT LTG, Additional Goal to be able to amb short distances from bed to chair  -LM (r) AH (t) LM (c)        User Key  (r) = Recorded By, (t) = Taken By, (c) = Cosigned By    Initials Name Provider Type    LM Josefina Al, PT Physical Therapist     Mariposa Norwood, PT Student PT Student          Therapy Charges for Today     Code Description Service Date Service Provider Modifiers Qty    61044920861 HC PT MOBILITY CURRENT 12/5/2017 Morena Cote, PT GP, CL 1    [id number] HC PT MOBILITY PROJECTED 12/5/2017 Morena Cote, PT GP, CL 1    [id number] HC PT MOBILITY DISCHARGE 12/5/2017 Morena Cote, PT GP, CL 1          PT Discharge Summary  Reason for Discharge: Discharge from facility  Outcomes Achieved: Discharge from facility occurred on same date as evluation  Discharge Destination: Home      Morena Cote, PT   12/5/2017

## 2017-12-07 LAB — METHYLMALONATE SERPL-SCNC: 268 NMOL/L (ref 0–378)

## 2018-03-14 ENCOUNTER — APPOINTMENT (OUTPATIENT)
Dept: GENERAL RADIOLOGY | Facility: HOSPITAL | Age: 83
End: 2018-03-14

## 2018-03-14 ENCOUNTER — HOSPITAL ENCOUNTER (EMERGENCY)
Facility: HOSPITAL | Age: 83
Discharge: HOME | End: 2018-03-14
Attending: STUDENT IN AN ORGANIZED HEALTH CARE EDUCATION/TRAINING PROGRAM | Admitting: STUDENT IN AN ORGANIZED HEALTH CARE EDUCATION/TRAINING PROGRAM

## 2018-03-14 VITALS
BODY MASS INDEX: 23.92 KG/M2 | HEART RATE: 65 BPM | SYSTOLIC BLOOD PRESSURE: 127 MMHG | HEIGHT: 63 IN | DIASTOLIC BLOOD PRESSURE: 53 MMHG | WEIGHT: 135 LBS | TEMPERATURE: 98 F | OXYGEN SATURATION: 97 % | RESPIRATION RATE: 16 BRPM

## 2018-03-14 DIAGNOSIS — F03.91 DEMENTIA WITH BEHAVIORAL DISTURBANCE, UNSPECIFIED DEMENTIA TYPE: Primary | ICD-10-CM

## 2018-03-14 LAB
ALBUMIN SERPL-MCNC: 3.9 G/DL (ref 3.5–5)
ALBUMIN/GLOB SERPL: 1 G/DL (ref 1–2)
ALP SERPL-CCNC: 89 U/L (ref 38–126)
ALT SERPL W P-5'-P-CCNC: 16 U/L (ref 13–69)
ANION GAP SERPL CALCULATED.3IONS-SCNC: 19 MMOL/L
AST SERPL-CCNC: 35 U/L (ref 15–46)
BASOPHILS # BLD AUTO: 0.05 10*3/MM3 (ref 0–0.2)
BASOPHILS NFR BLD AUTO: 0.6 % (ref 0–2.5)
BILIRUB SERPL-MCNC: 0.8 MG/DL (ref 0.2–1.3)
BILIRUB UR QL STRIP: NEGATIVE
BUN BLD-MCNC: 28 MG/DL (ref 7–20)
BUN/CREAT SERPL: 40 (ref 7.1–23.5)
CALCIUM SPEC-SCNC: 9.1 MG/DL (ref 8.4–10.2)
CHLORIDE SERPL-SCNC: 102 MMOL/L (ref 98–107)
CLARITY UR: CLEAR
CO2 SERPL-SCNC: 27 MMOL/L (ref 26–30)
COLOR UR: YELLOW
CREAT BLD-MCNC: 0.7 MG/DL (ref 0.6–1.3)
DEPRECATED RDW RBC AUTO: 53.8 FL (ref 37–54)
EOSINOPHIL # BLD AUTO: 0.18 10*3/MM3 (ref 0–0.7)
EOSINOPHIL NFR BLD AUTO: 2.2 % (ref 0–7)
ERYTHROCYTE [DISTWIDTH] IN BLOOD BY AUTOMATED COUNT: 15.7 % (ref 11.5–14.5)
GFR SERPL CREATININE-BSD FRML MDRD: 78 ML/MIN/1.73
GLOBULIN UR ELPH-MCNC: 4 GM/DL
GLUCOSE BLD-MCNC: 120 MG/DL (ref 74–98)
GLUCOSE UR STRIP-MCNC: NEGATIVE MG/DL
HCT VFR BLD AUTO: 32.4 % (ref 37–47)
HGB BLD-MCNC: 10.5 G/DL (ref 12–16)
HGB UR QL STRIP.AUTO: NEGATIVE
IMM GRANULOCYTES # BLD: 0.05 10*3/MM3 (ref 0–0.06)
IMM GRANULOCYTES NFR BLD: 0.6 % (ref 0–0.6)
KETONES UR QL STRIP: NEGATIVE
LEUKOCYTE ESTERASE UR QL STRIP.AUTO: NEGATIVE
LYMPHOCYTES # BLD AUTO: 1.58 10*3/MM3 (ref 0.6–3.4)
LYMPHOCYTES NFR BLD AUTO: 19.6 % (ref 10–50)
MCH RBC QN AUTO: 30.6 PG (ref 27–31)
MCHC RBC AUTO-ENTMCNC: 32.4 G/DL (ref 30–37)
MCV RBC AUTO: 94.5 FL (ref 81–99)
MONOCYTES # BLD AUTO: 0.87 10*3/MM3 (ref 0–0.9)
MONOCYTES NFR BLD AUTO: 10.8 % (ref 0–12)
NEUTROPHILS # BLD AUTO: 5.35 10*3/MM3 (ref 2–6.9)
NEUTROPHILS NFR BLD AUTO: 66.2 % (ref 37–80)
NITRITE UR QL STRIP: NEGATIVE
NRBC BLD MANUAL-RTO: 0 /100 WBC (ref 0–0)
PH UR STRIP.AUTO: 7.5 [PH] (ref 5–8)
PLATELET # BLD AUTO: 232 10*3/MM3 (ref 130–400)
PMV BLD AUTO: 8.8 FL (ref 6–12)
POTASSIUM BLD-SCNC: 4 MMOL/L (ref 3.5–5.1)
PROT SERPL-MCNC: 7.9 G/DL (ref 6.3–8.2)
PROT UR QL STRIP: NEGATIVE
RBC # BLD AUTO: 3.43 10*6/MM3 (ref 4.2–5.4)
SODIUM BLD-SCNC: 144 MMOL/L (ref 137–145)
SP GR UR STRIP: 1.02 (ref 1–1.03)
UROBILINOGEN UR QL STRIP: NORMAL
WBC NRBC COR # BLD: 8.08 10*3/MM3 (ref 4.8–10.8)

## 2018-03-14 PROCEDURE — 80053 COMPREHEN METABOLIC PANEL: CPT | Performed by: STUDENT IN AN ORGANIZED HEALTH CARE EDUCATION/TRAINING PROGRAM

## 2018-03-14 PROCEDURE — 71045 X-RAY EXAM CHEST 1 VIEW: CPT

## 2018-03-14 PROCEDURE — 99284 EMERGENCY DEPT VISIT MOD MDM: CPT

## 2018-03-14 PROCEDURE — 81003 URINALYSIS AUTO W/O SCOPE: CPT | Performed by: STUDENT IN AN ORGANIZED HEALTH CARE EDUCATION/TRAINING PROGRAM

## 2018-03-14 PROCEDURE — 85025 COMPLETE CBC W/AUTO DIFF WBC: CPT | Performed by: STUDENT IN AN ORGANIZED HEALTH CARE EDUCATION/TRAINING PROGRAM

## 2018-03-14 NOTE — ED PROVIDER NOTES
Subjective   Patient is a 89-year-old female with end-stage dementia who is cared for at home by her son, grandson, another caregiver, and daughter-in-law.  Grandson contact his father this morning the patient woke up and was crying and screaming and hyperventilating.  Unsure what to make of this and were concerned that there may be something more than just a behavioral abnormality wrong.  Patient is DO NOT RESUSCITATE and they understand her prognosis but they could not stand the idea that she may be in pain and could not tell them.  Patient can give no history whatsoever.            Review of Systems   All other systems reviewed and are negative.      Past Medical History:   Diagnosis Date   • ACS (acute coronary syndrome)    • Dementia    • Pneumonia    • Seizures    • Stroke    • TIA (transient ischemic attack)    • UTI (urinary tract infection)        No Known Allergies    Past Surgical History:   Procedure Laterality Date   • HYSTERECTOMY         History reviewed. No pertinent family history.    Social History     Social History   • Marital status:      Social History Main Topics   • Smoking status: Never Smoker   • Smokeless tobacco: Never Used   • Alcohol use No   • Drug use: No     Other Topics Concern   • Not on file           Objective   Physical Exam   Nursing note and vitals reviewed.    GEN: No acute distress, tearful at times and does appear frightened  Head: Normocephalic, atraumatic  Eyes: Pupils equal round reactive to light  ENT: Posterior pharynx normal in appearance, oral mucosa is moist  Chest: Nontender to palpation  Cardiovascular: Regular rate  Lungs: Clear to auscultation bilaterally  Abdomen: Soft, nontender, nondistended, no peritoneal signs  Extremities: No edema, normal appearance  Neuro: Will comply with commands and can converse even know what she says normally does not make sense  Psych: Seems frightened    Procedures         ED Course  ED Course                  MDM  Number  of Diagnoses or Management Options  Dementia with behavioral disturbance, unspecified dementia type:   Diagnosis management comments: Workup revealed no significant abnormalities.  I do believe is related to her dementia.  Did discuss this with family and they were comfortable taking her home.       Amount and/or Complexity of Data Reviewed  Clinical lab tests: reviewed  Tests in the radiology section of CPT®: reviewed  Decide to obtain previous medical records or to obtain history from someone other than the patient: yes  Obtain history from someone other than the patient: yes        Final diagnoses:   Dementia with behavioral disturbance, unspecified dementia type            Yvon Barba MD  03/14/18 4924

## 2018-03-14 NOTE — ED NOTES
PT IS GIVEN A FOOD TRAY. PT IS UNAWARE THAT SHE IS EATING. PT IS UPSET; SAD AND CONFUSED. GREAT-GRANDSON IS AT BEDSIDE.      Reina Chavez RN  03/14/18 4235

## 2018-03-14 NOTE — ED NOTES
Orthostatics were not ordered on this patient. Another patients orthostatics were recorded in this chart and unable to be edited off.      Reina Chavez RN  03/14/18 5061

## 2023-09-29 NOTE — DISCHARGE INSTR - OTHER ORDERS
If you have any questions about your recovery, please call the [facility] Nurse Call Center at [phone]. A registered nurse is available 24 hours a day 7 days a week to assist you.   
29-Sep-2023 02:20